# Patient Record
Sex: MALE | Race: WHITE | NOT HISPANIC OR LATINO | Employment: FULL TIME | URBAN - METROPOLITAN AREA
[De-identification: names, ages, dates, MRNs, and addresses within clinical notes are randomized per-mention and may not be internally consistent; named-entity substitution may affect disease eponyms.]

---

## 2018-09-07 ENCOUNTER — OFFICE VISIT CONVERTED (OUTPATIENT)
Dept: FAMILY MEDICINE CLINIC | Facility: CLINIC | Age: 48
End: 2018-09-07
Attending: NURSE PRACTITIONER

## 2020-10-05 ENCOUNTER — HOSPITAL ENCOUNTER (OUTPATIENT)
Dept: URGENT CARE | Facility: CLINIC | Age: 50
Discharge: HOME | End: 2020-10-05
Attending: FAMILY MEDICINE

## 2020-10-08 LAB — SARS-COV-2 RNA SPEC QL NAA+PROBE: NOT DETECTED

## 2021-05-16 VITALS
SYSTOLIC BLOOD PRESSURE: 117 MMHG | RESPIRATION RATE: 16 BRPM | TEMPERATURE: 97.6 F | BODY MASS INDEX: 20.02 KG/M2 | OXYGEN SATURATION: 99 % | WEIGHT: 143 LBS | DIASTOLIC BLOOD PRESSURE: 72 MMHG | HEART RATE: 48 BPM | HEIGHT: 71 IN

## 2021-09-10 PROCEDURE — U0004 COV-19 TEST NON-CDC HGH THRU: HCPCS | Performed by: NURSE PRACTITIONER

## 2021-09-12 ENCOUNTER — TELEPHONE (OUTPATIENT)
Dept: URGENT CARE | Facility: CLINIC | Age: 51
End: 2021-09-12

## 2021-09-14 ENCOUNTER — TELEPHONE (OUTPATIENT)
Dept: URGENT CARE | Facility: CLINIC | Age: 51
End: 2021-09-14

## 2022-09-16 ENCOUNTER — HOSPITAL ENCOUNTER (EMERGENCY)
Facility: HOSPITAL | Age: 52
Discharge: HOME | End: 2022-09-16
Attending: EMERGENCY MEDICINE | Admitting: EMERGENCY MEDICINE

## 2022-09-16 ENCOUNTER — APPOINTMENT (OUTPATIENT)
Dept: GENERAL RADIOLOGY | Facility: HOSPITAL | Age: 52
End: 2022-09-16

## 2022-09-16 VITALS
TEMPERATURE: 98.1 F | HEART RATE: 47 BPM | BODY MASS INDEX: 21.74 KG/M2 | SYSTOLIC BLOOD PRESSURE: 133 MMHG | WEIGHT: 160.5 LBS | HEIGHT: 72 IN | RESPIRATION RATE: 16 BRPM | OXYGEN SATURATION: 99 % | DIASTOLIC BLOOD PRESSURE: 89 MMHG

## 2022-09-16 DIAGNOSIS — S61.511A LACERATION OF RIGHT WRIST, INITIAL ENCOUNTER: Primary | ICD-10-CM

## 2022-09-16 DIAGNOSIS — S67.31XA CRUSHING INJURY OF RIGHT WRIST, INITIAL ENCOUNTER: ICD-10-CM

## 2022-09-16 PROCEDURE — 73130 X-RAY EXAM OF HAND: CPT

## 2022-09-16 PROCEDURE — 99283 EMERGENCY DEPT VISIT LOW MDM: CPT

## 2022-09-16 PROCEDURE — 73090 X-RAY EXAM OF FOREARM: CPT

## 2022-09-16 RX ORDER — HYDROCODONE BITARTRATE AND ACETAMINOPHEN 10; 325 MG/1; MG/1
1 TABLET ORAL EVERY 6 HOURS PRN
Status: DISCONTINUED | OUTPATIENT
Start: 2022-09-16 | End: 2022-09-16 | Stop reason: HOSPADM

## 2022-09-16 RX ORDER — HYDROCODONE BITARTRATE AND ACETAMINOPHEN 5; 325 MG/1; MG/1
1 TABLET ORAL EVERY 6 HOURS PRN
Qty: 12 TABLET | Refills: 0 | Status: SHIPPED | OUTPATIENT
Start: 2022-09-16 | End: 2022-09-19

## 2022-09-16 RX ORDER — CEPHALEXIN 500 MG/1
500 CAPSULE ORAL 2 TIMES DAILY
Qty: 10 CAPSULE | Refills: 0 | Status: SHIPPED | OUTPATIENT
Start: 2022-09-16 | End: 2022-09-21

## 2022-09-16 RX ORDER — LIDOCAINE HYDROCHLORIDE AND EPINEPHRINE 10; 10 MG/ML; UG/ML
10 INJECTION, SOLUTION INFILTRATION; PERINEURAL ONCE
Status: COMPLETED | OUTPATIENT
Start: 2022-09-16 | End: 2022-09-16

## 2022-09-16 RX ORDER — GINSENG 100 MG
1 CAPSULE ORAL ONCE
Status: COMPLETED | OUTPATIENT
Start: 2022-09-16 | End: 2022-09-16

## 2022-09-16 RX ORDER — LIDOCAINE AND PRILOCAINE 25; 25 MG/G; MG/G
1 CREAM TOPICAL ONCE
Status: COMPLETED | OUTPATIENT
Start: 2022-09-16 | End: 2022-09-16

## 2022-09-16 RX ADMIN — LIDOCAINE HYDROCHLORIDE,EPINEPHRINE BITARTRATE 10 ML: 10; .01 INJECTION, SOLUTION INFILTRATION; PERINEURAL at 09:10

## 2022-09-16 RX ADMIN — LIDOCAINE AND PRILOCAINE 1 APPLICATION: 25; 25 CREAM TOPICAL at 09:10

## 2022-09-16 RX ADMIN — Medication 1 APPLICATION: at 10:07

## 2022-09-16 RX ADMIN — HYDROCODONE BITARTRATE AND ACETAMINOPHEN 1 TABLET: 10; 325 TABLET ORAL at 09:10

## 2022-09-16 NOTE — ED PROVIDER NOTES
"Patient is 52 y.o. year old male that presents to the ED for evaluation of injury of right hand and wrist.     Physical Exam    ED Course:    /89   Pulse (!) 47   Temp 98.1 °F (36.7 °C) (Oral)   Resp 16   Ht 182.9 cm (72\")   Wt 72.8 kg (160 lb 7.9 oz)   SpO2 99%   BMI 21.77 kg/m²   Results for orders placed or performed during the hospital encounter of 09/10/21   COVID-19 PCR, [facility], NP SWAB IN LEXAR VIRAL TRANSPORT MEDIA/ORAL SWISH 24-30 HR TAT - Swab, Nasopharynx    Specimen: Nasopharynx; Swab   Result Value Ref Range    SARS-CoV-2 RACHAEL Not Detected Not Detected   POCT SARS-CoV-2 Antigen BOAZ   ([facility])    Specimen: Swab   Result Value Ref Range    SARS Antigen Not Detected Not Detected    Internal Control Passed Passed    Lot Number [id number]     Expiration Date 22,123      Medications   HYDROcodone-acetaminophen (NORCO)  MG per tablet 1 tablet (1 tablet Oral Given 9/16/22 0910)   lidocaine-prilocaine (EMLA) 2.5-2.5 % cream 1 application (1 application Topical Given 9/16/22 0910)   lidocaine 1% - EPINEPHrine 1:215229 (XYLOCAINE W/EPI) 1 %-1:617440 injection 10 mL (10 mL Injection Given 9/16/22 0910)   bacitracin 500 UNIT/GM ointment 1 application (1 application Topical Given 9/16/22 1007)     XR Forearm 2 View Right    Result Date: 9/16/2022  Narrative: PROCEDURE: XR FOREARM 2 VW RIGHT  COMPARISON: None  INDICATIONS: LATERAL MID RIGHT FOREARM KNOT, FALL ON STAIRS MOVING FRIDGE  FINDINGS:  Laceration noted along the ulnar aspect of the wrist no radiopaque foreign body identified.  There is no acute osseous abnormality appreciated.  Soft tissue swelling noted along the ventral aspect of the distal forearm into the wrist.  Mild induration of the subcutaneous fat along the mid radial aspect of the forearm      Impression:   1. Soft tissue swelling and laceration at the wrist 2. Mild soft tissue swelling of the mid forearm 3. No underlying osseous abnormality identified      " GABBY HERNANDEZ MD       Electronically Signed and Approved By: GABBY HERNANDEZ MD on 9/16/2022 at 9:06             XR Hand 3+ View Right    Result Date: 9/16/2022  Narrative: PROCEDURE: XR HAND 3+ VW RIGHT  COMPARISON: None  INDICATIONS: RIGHT HAND PAIN/MEDIAL 5TH METACARPAL LACERATION, FALL BRINGING FRIDGE UP STAIRS  FINDINGS:  Mild soft tissue swelling and lucency at the base of the ulnar aspect of the hand compatible with given history of laceration.  No radiopaque foreign body or acute osseous abnormality noted.  Mild degenerative changes noted of the wrist greatest along the radial aspect of the intercarpal row      Impression:   1. Soft tissue swelling and laceration without radiopaque foreign body 2. No definite acute osseous abnormality      GABBY HERNANDEZ MD       Electronically Signed and Approved By: GABBY HERNANDEZ MD on 9/16/2022 at 8:34               MDM:    Procedures      The case was discussed between the ERNIE and myself. Patient  care including, but not limited to ordered imaging, medications, and lab results were reviewed. I then performed the substantive portion of the visit including all aspects of the medical decision making.        Justo Diaz DO  12:31 EDT  09/16/22       Justo Diaz DO  09/16/22 1231

## 2022-09-16 NOTE — ED PROVIDER NOTES
Subjective   History of Present Illness  The patient is a 52-year-old male with no prominent past medical history presents emergency department with chief complaint of right wrist injury and laceration.  The patient was using a logan to move a refrigerator out of his house.  He was going down a front porch steps when the logan tipped backwards.  The refrigerator and the logan landed on his right wrist and trapping it between the tile patio step and the logan.  Entrapped for matter of seconds.  Has a laceration to medial right wrist with swelling and pain.  Also notes some swelling to the middle forearm.  Denies any numbness.  Pain with movement.  Tetanus is up-to-date. Pain is moderate to severe and sharp.    History provided by:  Patient   used: No    Laceration  Location:  Shoulder/arm  Shoulder/arm laceration location:  R wrist  Length:  4 cm  Depth:  Through dermis  Quality: straight    Bleeding: controlled    Time since incident:  1 hour  Laceration mechanism:  Fall  Pain details:     Quality:  Sharp and aching  Foreign body present:  No foreign bodies  Relieved by:  None tried  Worsened by:  Nothing  Ineffective treatments:  None tried  Tetanus status:  Up to date  Associated symptoms: swelling    Associated symptoms: no fever and no numbness           Review of Systems   Constitutional: Negative for chills and fever.   Gastrointestinal: Negative for nausea and vomiting.   Musculoskeletal: Positive for arthralgias.   Skin: Positive for wound.   Neurological: Negative for weakness and numbness.   All other systems reviewed and are negative.      No past medical history on file.    Allergies   Allergen Reactions   • Codeine Hives and Nausea And Vomiting       Past Surgical History:   Procedure Laterality Date   • APPENDECTOMY         No family history on file.    Social History     Socioeconomic History   • Marital status:    Tobacco Use   • Smoking status: Current Every Day Smoker      Packs/day: 1.00     Types: Cigarettes   • Smokeless tobacco: Never Used   Vaping Use   • Vaping Use: Never used   Substance and Sexual Activity   • Alcohol use: Never   • Drug use: Defer   • Sexual activity: Defer           Objective   Physical Exam  Vitals and nursing note reviewed.   Constitutional:       Appearance: Normal appearance. He is not ill-appearing or toxic-appearing.   Cardiovascular:      Rate and Rhythm: Normal rate.      Pulses: Normal pulses.   Pulmonary:      Effort: Pulmonary effort is normal.   Musculoskeletal:         General: Swelling and tenderness present.   Skin:     Capillary Refill: Capillary refill takes less than 2 seconds.      Comments: laceration   Neurological:      Mental Status: He is alert.      Sensory: No sensory deficit.         Laceration Repair    Date/Time: 9/16/2022 10:43 AM  Performed by: Olamide Ness PA-C  Authorized by: Justo Diaz DO     Consent:     Consent obtained:  Verbal    Consent given by:  Patient    Risks, benefits, and alternatives were discussed: yes      Risks discussed:  Infection, pain, nerve damage and need for additional repair    Alternatives discussed:  No treatment  Universal protocol:     Patient identity confirmed:  Verbally with patient  Anesthesia:     Anesthesia method:  Topical application and local infiltration    Topical anesthetic:  EMLA cream    Local anesthetic:  Lidocaine 1% w/o epi  Laceration details:     Location:  Shoulder/arm    Shoulder/arm location:  R lower arm    Length (cm):  4  Exploration:     Imaging obtained: x-ray      Wound exploration: wound explored through full range of motion      Contaminated: no    Treatment:     Area cleansed with:  Chlorhexidine  Skin repair:     Repair method:  Sutures    Suture size:  4-0    Wound skin closure material used: ethilon.    Suture technique:  Simple interrupted    Number of sutures:  11  Approximation:     Approximation:  Close  Repair type:     Repair type:   Simple  Post-procedure details:     Dressing:  Antibiotic ointment and splint for protection    Procedure completion:  Tolerated well, no immediate complications               ED Course                                           MDM     The patient presented with a laceration in need of repair. See laceration repair note for details. The wound was irrigated with copious normal saline irrigation. 11 sutures were used to approximate the wound edges. Tetanus was not given. The patient tolerated the procedure well. Acute bleeding has ceased and the wound was approximated in the emergency department. I have reviewed the imaging, no fracture noted by radiology however small step-off questionable for hairline fracture. Discussed with Dr. Diaz supervising physician, in agreement with splint placement and orthopedic follow-up. He is neurovascularly intact. Patient was counseled to keep the wound clean, dry, and out of the sun. Patient was counseled to change dressings daily. Patient was advised to return to the ED for worsening erythema, pain, swelling, fever, excessive drainage or signs of infection. They were counseled to follow up for suture removal as described in the discharge instructions. Patient verbalizes understanding and agrees to follow up as instructed.          Final diagnoses:   Laceration of right wrist, initial encounter   Crushing injury of right wrist, initial encounter       ED Disposition  ED Disposition     ED Disposition   Discharge    Condition   Stable    Comment   --             Chiki Souza MD  [address] [id number] [phone]    Call   and schedule a follow-up appointment    Cayden Kunz MD  [address] [facility] [address] [phone]          [facility] EMERGENCY ROOM  [address] [facility] [address] [phone]    If symptoms worsen         Medication List      New Prescriptions    cephalexin 500 MG capsule  Commonly known as:  KEFLEX  Take 1 capsule by mouth 2 (Two) Times a Day for 5 days.     HYDROcodone-acetaminophen 5-325 MG per tablet  Commonly known as: NORCO  Take 1 tablet by mouth Every 6 (Six) Hours As Needed for Moderate Pain or Severe Pain for up to 3 days.           Where to Get Your Medications      These medications were sent to [facility]/pharmacy #[id number] - [place], [place] - [address] - [phone] [facility] [phone]   [address] [place] [address]    Hours: 24-hours Phone: [phone]   · cephalexin 500 MG capsule  · HYDROcodone-acetaminophen 5-325 MG per tablet          Olamide Ness PA-C  09/16/22 7720

## 2022-09-16 NOTE — DISCHARGE INSTRUCTIONS
On your X-ray there is a small step-off that could be a questionable hairline fracture. We have repaired your laceration and placed a splint. I have referred you to orhthopedics. Call their office today or first thing on Monday to schedule a follow-up appointment. Return to the ED with any signs of infection or a cold/pale/numb extremity.

## 2022-09-20 ENCOUNTER — OFFICE VISIT (OUTPATIENT)
Dept: ORTHOPEDIC SURGERY | Facility: CLINIC | Age: 52
End: 2022-09-20

## 2022-09-20 VITALS — WEIGHT: 160 LBS | BODY MASS INDEX: 21.67 KG/M2 | HEART RATE: 69 BPM | HEIGHT: 72 IN | OXYGEN SATURATION: 99 %

## 2022-09-20 DIAGNOSIS — S69.91XA INJURY OF RIGHT WRIST, INITIAL ENCOUNTER: ICD-10-CM

## 2022-09-20 DIAGNOSIS — S61.511A LACERATION OF SKIN OF RIGHT WRIST, INITIAL ENCOUNTER: Primary | ICD-10-CM

## 2022-09-20 PROCEDURE — 99203 OFFICE O/P NEW LOW 30 MIN: CPT | Performed by: ORTHOPAEDIC SURGERY

## 2022-09-20 NOTE — PROGRESS NOTES
"Chief Complaint  Initial Evaluation of the Right Forearm and Initial Evaluation of the Right Hand     Subjective      Carlos Alberto Juan presents to [facility] for an evaluation of right forearm and right hand. Patient was using a logan to move a fridge out his home. When going down the porch steps, the logan tipped backwards and the fridge landed on his right arm. This trapped it between the patio tile step and logan. Patient had immediate pain when this occurred. Injury sustained on 9/16/22. Most of his pain is about the dorsum aspect of the hand and wrist.     Allergies   Allergen Reactions   • Codeine Hives and Nausea And Vomiting        Social History     Socioeconomic History   • Marital status:    Tobacco Use   • Smoking status: Current Every Day Smoker     Packs/day: 1.00     Types: Cigarettes   • Smokeless tobacco: Never Used   Vaping Use   • Vaping Use: Never used   Substance and Sexual Activity   • Alcohol use: Never   • Drug use: Defer   • Sexual activity: Defer        Review of Systems     Objective   Vital Signs:   Pulse 69   Ht 182.9 cm (72\")   Wt 72.6 kg (160 lb)   SpO2 99%   BMI 21.70 kg/m²       Physical Exam  Constitutional:       Appearance: Normal appearance. Patient is well-developed and normal weight.   HENT:      Head: Normocephalic.      Right Ear: Hearing and external ear normal.      Left Ear: Hearing and external ear normal.      Nose: Nose normal.   Eyes:      Conjunctiva/sclera: Conjunctivae normal.   Cardiovascular:      Rate and Rhythm: Normal rate.   Pulmonary:      Effort: Pulmonary effort is normal.      Breath sounds: No wheezing or rales.   Abdominal:      Palpations: Abdomen is soft.      Tenderness: There is no abdominal tenderness.   Musculoskeletal:      Cervical back: Normal range of motion.   Skin:     Findings: No rash.   Neurological:      Mental Status: Patient is alert and oriented to person, place, and time.   Psychiatric:    "      Mood and Affect: Mood and affect normal.         Judgment: Judgment normal.       Ortho Exam      RIGHT ARM: Laceration noted on the ulnar aspect of the wrist, stitches noted. Sensation grossly intact. Neurovascular intact.  Radial pulse 2+, ulnar pulse 2+. Moderate swelling. Good supination and pronation of the wrist. Patient able to form a fist.      Procedures      Imaging Results (Most Recent)     None           Result Review :         XR Forearm 2 View Right    Result Date: 9/16/2022  Narrative: PROCEDURE: XR FOREARM 2 VW RIGHT  COMPARISON: None  INDICATIONS: LATERAL MID RIGHT FOREARM KNOT, FALL ON STAIRS MOVING FRIDGE  FINDINGS:  Laceration noted along the ulnar aspect of the wrist no radiopaque foreign body identified.  There is no acute osseous abnormality appreciated.  Soft tissue swelling noted along the ventral aspect of the distal forearm into the wrist.  Mild induration of the subcutaneous fat along the mid radial aspect of the forearm      Impression:   1. Soft tissue swelling and laceration at the wrist 2. Mild soft tissue swelling of the mid forearm 3. No underlying osseous abnormality identified      GABBY HERNANDEZ MD       Electronically Signed and Approved By: GABBY HERNANDEZ MD on 9/16/2022 at 9:06             XR Hand 3+ View Right    Result Date: 9/16/2022  Narrative: PROCEDURE: XR HAND 3+ VW RIGHT  COMPARISON: None  INDICATIONS: RIGHT HAND PAIN/MEDIAL 5TH METACARPAL LACERATION, FALL BRINGING FRIDGE UP STAIRS  FINDINGS:  Mild soft tissue swelling and lucency at the base of the ulnar aspect of the hand compatible with given history of laceration.  No radiopaque foreign body or acute osseous abnormality noted.  Mild degenerative changes noted of the wrist greatest along the radial aspect of the intercarpal row      Impression:   1. Soft tissue swelling and laceration without radiopaque foreign body 2. No definite acute osseous abnormality      GABBY HERNANDEZ MD       Electronically  Signed and Approved By: GABBY HERNANDEZ MD on 9/16/2022 at 8:34                      Assessment and Plan     Diagnoses and all orders for this visit:    1. Laceration of skin of right wrist, initial encounter (Primary)    2. Injury of right wrist, initial encounter        Continue antibiotics. Ice and elevate the right wrist to help reduce swelling.   Anti-inflammatory medication prescribed.   Work on finger range of motion to avoid stiffness.   Work note provided for the patient.     Call or return if worsening symptoms.    Follow Up     9/29/22 for wound check and stitch removal.       Patient was given instructions and counseling regarding his condition or for health maintenance advice. Please see specific information pulled into the AVS if appropriate.     Scribed for Chiki Souza MD by Sary Gao.  09/20/22   08:58 EDT    I have personally performed the services described in this document as scribed by the above individual and it is both accurate and complete. Chiki Souza MD 09/20/22

## 2022-09-22 ENCOUNTER — PATIENT ROUNDING (BHMG ONLY) (OUTPATIENT)
Dept: ORTHOPEDIC SURGERY | Facility: CLINIC | Age: 52
End: 2022-09-22

## 2022-09-22 NOTE — PROGRESS NOTES
A [employer] message has been sent to the patient for [place]IENT [place] [facility]

## 2022-09-29 ENCOUNTER — OFFICE VISIT (OUTPATIENT)
Dept: ORTHOPEDIC SURGERY | Facility: CLINIC | Age: 52
End: 2022-09-29

## 2022-09-29 VITALS — HEIGHT: 72 IN | WEIGHT: 160 LBS | BODY MASS INDEX: 21.67 KG/M2

## 2022-09-29 DIAGNOSIS — S69.91XA INJURY OF RIGHT WRIST, INITIAL ENCOUNTER: ICD-10-CM

## 2022-09-29 DIAGNOSIS — S61.511A LACERATION OF SKIN OF RIGHT WRIST, INITIAL ENCOUNTER: Primary | ICD-10-CM

## 2022-09-29 PROCEDURE — 99213 OFFICE O/P EST LOW 20 MIN: CPT | Performed by: PHYSICIAN ASSISTANT

## 2022-09-29 NOTE — PROGRESS NOTES
"Chief Complaint  Follow-up of the Right Wrist    Subjective      Carlos Alberto Juan presents to [facility] for follow-up of right wrist injury sustained on 9/16/2022.  The patient was using a logan to move a refrigerator.  The logan tipped backwards while he was taking this down the stairs and the refrigerator fell, landing on his arm and pinning it in between the refrigerator and the stair.  He sustained a laceration, which was repaired with sutures.  He presents today for suture removal.  X-rays showed no acute osseous injury.  Does report some continued pain in his right wrist, although is able to perform full ROM.    Objective   Allergies   Allergen Reactions   • Codeine Hives and Nausea And Vomiting       Vital Signs:   Ht 182.9 cm (72\")   Wt 72.6 kg (160 lb)   BMI 21.70 kg/m²       Physical Exam    Constitutional: Awake, alert. Well nourished appearance.    Integumentary: Warm, dry, intact. No obvious rashes.    HENT: Atraumatic, normocephalic.   Respiratory: Non labored respirations .   Cardiovascular: Intact peripheral pulses.    Psychiatric: Normal mood and affect. A&O X3    Ortho Exam  Right wrist: Sutures removed.  Laceration appears well-healing -clean, dry, and intact without any surrounding erythema, warmth, or drainage.  Full wrist flexion, extension, supination, pronation.  Patient is able to make a full fist.  Good thumb opposition.  Full abduction and adduction of the fingers.    Imaging Results (Most Recent)     None            Assessment and Plan   Problem List Items Addressed This Visit    None     Visit Diagnoses     Laceration of skin of right wrist, subsequent encounter    -  Primary    Injury of right wrist, subsequent encounter            Follow Up   Return if symptoms worsen or fail to improve.    Patient Instructions   Sutures removed in office and Steri-Strips applied.  Patient educated on incision care.  Please keep incision clean and dry.  Do not soak " or submerge in water until incision is fully healed.  Do not apply creams or lotions over the incision.  Please allow Steri-Strips to fall off on their own within 7 to 10 days.    Continue icing as needed to help with pain and swelling.  Ice up to 3 or 4 times daily for no longer than 15 to 20 minutes at a time. Advised on gentle hand and wrist ROM. Gradually return to normal activity as tolerated.     Work note provided.     Follow-up as needed. Please call with questions or concerns.      Patient was given instructions and counseling regarding his condition or for health maintenance advice. Please see specific information pulled into the AVS if appropriate.

## 2022-09-29 NOTE — PATIENT INSTRUCTIONS
Sutures removed in office and Steri-Strips applied.  Patient educated on incision care.  Please keep incision clean and dry.  Do not soak or submerge in water until incision is fully healed.  Do not apply creams or lotions over the incision.  Please allow Steri-Strips to fall off on their own within 7 to 10 days.    Continue icing as needed to help with pain and swelling.  Ice up to 3 or 4 times daily for no longer than 15 to 20 minutes at a time. Advised on gentle hand and wrist ROM. Gradually return to normal activity as tolerated.     Work note provided.     Follow-up as needed. Please call with questions or concerns.

## 2022-10-10 ENCOUNTER — TELEPHONE (OUTPATIENT)
Dept: ORTHOPEDIC SURGERY | Facility: CLINIC | Age: 52
End: 2022-10-10

## 2022-10-10 NOTE — TELEPHONE ENCOUNTER
Caller: EVERARDO     Relationship to patient: SELF     Best call back number: [phone]     Patient is needing: PT RETURNED TO WORK AFTER WRIST INJURY 10.8.22 , HE STARTED HAVING A LOT OF PAIN IN THAT WRIST DURING WORK AND IS CALLING IN TO SEE WHAT HE COULD DO NOW. HE IS SCHEDULED TO DO BACK INTO WORK TONIGHT 10.10.22. PLEASE ADVISE

## 2022-10-10 NOTE — TELEPHONE ENCOUNTER
PATIENT CALLED BACK- STATES HE SLATED TO RETURN TO WORK THIS EVENING- NEEDS TO KNOW WHAT HE SHOULD DO OR NOT DO.  DOES HE NEED A NOTE FOR WORK REGARDING THE INCREASE IN PAIN IN HIS HAND.  PATIENT STATES HE CAN  A NOTE IF A NOTE IS GIVEN FOR HIM TO TAKE TO WORK.  PLEASE CONTACT PATIENT- HUB ATTEMPTED W/T

## 2022-10-11 NOTE — TELEPHONE ENCOUNTER
SPOKE WITH PATIENT, LET HIM KNOW OF CARO'S BELOW MESSAGE. PATIENT IS HAVING MULTIPLE ISSUES WITH PAIN IN THE WRIST AND INABILITY TO PREFORM HIS WORK DUTIES. HE DOES NOT FEEL LIKE HE IS ABLE TO RETURN TO HIS NORMAL WORK DUTIES AT THIS TIME AND IS CONCERNED THAT THE WRIST IS NOT HEALING CORRECTLY. PATIENT WAS TO FOLLOW UP AS NEEDED AFTER LAST APPT SO HE HAS NO FOLLOW UP AT THIS TIME. APPOINTMENT SCHEDULED FOR PATIENT TO FOLLOW UP ON 10/13/22 AT 2:00PM.

## 2022-10-13 ENCOUNTER — OFFICE VISIT (OUTPATIENT)
Dept: ORTHOPEDIC SURGERY | Facility: CLINIC | Age: 52
End: 2022-10-13

## 2022-10-13 VITALS — WEIGHT: 160 LBS | BODY MASS INDEX: 21.67 KG/M2 | HEIGHT: 72 IN

## 2022-10-13 DIAGNOSIS — S69.91XA INJURY OF RIGHT WRIST, INITIAL ENCOUNTER: ICD-10-CM

## 2022-10-13 DIAGNOSIS — S69.91XA INJURY OF RIGHT WRIST, INITIAL ENCOUNTER: Primary | ICD-10-CM

## 2022-10-13 PROCEDURE — 99213 OFFICE O/P EST LOW 20 MIN: CPT | Performed by: PHYSICIAN ASSISTANT

## 2022-10-13 NOTE — PATIENT INSTRUCTIONS
X-rays taken and reviewed. Discussed with Dr. Souza. Patient advised to return to wrist brace. No lifting, pushing, or pulling. Referral to PT sent today. Continue home exercises.     Work note provided for patient to remain off.     Follow up in 4 weeks. No imaging. Call with changes or concerns.

## 2022-10-13 NOTE — PROGRESS NOTES
"Chief Complaint  Follow-up of the Right Wrist    Subjective      Carlos Alberto Juan presents to [facility] for follow-up of right wrist injury sustained on 9/16/2022.  Patient was attempting to move a refrigerator using a logan when the logan tipped backwards and the refrigerator fell, landing on his arm.  He sustained a laceration, treated with suture repair, which were subsequently removed.  X-rays of the hand and forearm were negative for fracture.  He was last seen in office on 9/29/2022 with recommendations to work on gentle range of motion and gradually return to activity as tolerated.  Patient reports he was on vacation and did minimal activity, tolerated well.  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  Reports his wrist was swollen.  He does not feel as if he is able to perform his work duties at this time.    Objective   Allergies   Allergen Reactions   • Codeine Hives and Nausea And Vomiting       Vital Signs:   Ht 182.9 cm (72\")   Wt 72.6 kg (160 lb)   BMI 21.70 kg/m²       Physical Exam    Constitutional: Awake, alert. Well nourished appearance.    Integumentary: Warm, dry, intact. No obvious rashes.    HENT: Atraumatic, normocephalic.   Respiratory: Non labored respirations .   Cardiovascular: Intact peripheral pulses.    Psychiatric: Normal mood and affect. A&O X3    Ortho Exam  Right wrist: Mild tenderness to palpation throughout the entire right wrist.  Pain limited flexion and extension of the right wrist.  Limited supination pronation.  Patient is able to form a full fist.  Sensation intact to light touch.  Distal neurovascular intact with brisk capillary refill and 2+ radial and ulnar pulses.  Good thumb opposition.    Imaging Results (Most Recent)     Procedure Component Value Units Date/Time    XR Wrist 2 View Right [[id number]] Resulted: 10/13/22 1536     Updated: " 10/13/22 1536    Narrative:      X-Ray Report:  Study: X-rays ordered, taken in the office, and reviewed today.   Site: right wrist Xray  Indication: Pain  View: AP and Lateral view(s)  Findings: No acute osseous abnormalities identified.  Prior studies available for comparison: no                       Assessment and Plan   Problem List Items Addressed This Visit    None  Visit Diagnoses     Injury of right wrist, subsequent encounter    -  Primary    Relevant Orders    XR Wrist 2 View Right (Completed)    Ambulatory Referral to Physical Therapy Evaluate and treat; Stretching, ROM, Strengthening (Completed)    Injury of right wrist, initial encounter        Relevant Orders    XR Wrist 2 View Right (Completed)    Ambulatory Referral to Physical Therapy Evaluate and treat; Stretching, ROM, Strengthening (Completed)          Follow Up   Return in about 4 weeks (around 11/10/2022).    Patient Instructions   X-rays taken and reviewed. Discussed with Dr. Souza. Patient advised to return to wrist brace. No lifting, pushing, or pulling. Referral to PT sent today. Continue home exercises.     Work note provided for patient to remain off.     Follow up in 4 weeks. No imaging. Call with changes or concerns.     Patient was given instructions and counseling regarding his condition or for health maintenance advice. Please see specific information pulled into the AVS if appropriate.

## 2022-11-10 ENCOUNTER — OFFICE VISIT (OUTPATIENT)
Dept: ORTHOPEDIC SURGERY | Facility: CLINIC | Age: 52
End: 2022-11-10

## 2022-11-10 VITALS — WEIGHT: 160 LBS | BODY MASS INDEX: 21.67 KG/M2 | HEIGHT: 72 IN

## 2022-11-10 DIAGNOSIS — S69.91XA INJURY OF RIGHT WRIST, INITIAL ENCOUNTER: Primary | ICD-10-CM

## 2022-11-10 PROCEDURE — 99213 OFFICE O/P EST LOW 20 MIN: CPT | Performed by: PHYSICIAN ASSISTANT

## 2022-11-10 NOTE — PATIENT INSTRUCTIONS
Patient advised to continue PT to completion. Continue home exercises upon discharge. Gradually return to activity as tolerated.     Follow up in 2 weeks. Evaluate improvement with PT and readiness to return to work. No imaging. Call with changes or concerns.

## 2022-11-10 NOTE — PROGRESS NOTES
"Chief Complaint  Follow-up of the Right Wrist    Subjective      Carlos Alberto Juan presents to [facility] for follow-up of right wrist injury sustained on 9/16/2022.  X-rays of the hand and forearm were negative for fractures.  He was last seen in office on 10/13/2022 and received referral to physical therapy.  He presents today for follow-up reporting that he is \"definitely getting better\".  Does report that he has had increased pain since PT session on Monday.  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    Objective   Allergies   Allergen Reactions   • Codeine Hives and Nausea And Vomiting       Vital Signs:   Ht 182.9 cm (72\")   Wt 72.6 kg (160 lb)   BMI 21.70 kg/m²       Physical Exam    Constitutional: Awake, alert. Well nourished appearance.    Integumentary: Warm, dry, intact. No obvious rashes.    HENT: Atraumatic, normocephalic.   Respiratory: Non labored respirations .   Cardiovascular: Intact peripheral pulses.    Psychiatric: Normal mood and affect. A&O X3    Ortho Exam  Right wrist: Mild edema.  Limited wrist flexion and extension.  Full supination and pronation.  Patient is able to form a full fist.  Sensation intact to light touch.  Distal neurovascular intact.    Imaging Results (Most Recent)     None           Assessment and Plan   Problem List Items Addressed This Visit    None  Visit Diagnoses     Injury of right wrist, subsequent encounter    -  Primary        Follow Up   Return in about 2 weeks (around 11/24/2022).  Educated on risk of smoking. Discussed options for smoking cessation.    Patient Instructions   Patient advised to continue PT to completion. Continue home exercises upon discharge. Gradually return to activity as tolerated.     Follow up in 2 weeks. Evaluate improvement with PT and readiness to return to work. No imaging. Call with changes or " concerns.     Patient was given instructions and counseling regarding his condition or for health maintenance advice. Please see specific information pulled into the AVS if appropriate.

## 2022-12-02 ENCOUNTER — OFFICE VISIT (OUTPATIENT)
Dept: ORTHOPEDIC SURGERY | Facility: CLINIC | Age: 52
End: 2022-12-02

## 2022-12-02 VITALS — HEIGHT: 72 IN | BODY MASS INDEX: 21.67 KG/M2 | WEIGHT: 160 LBS

## 2022-12-02 DIAGNOSIS — S69.91XA INJURY OF RIGHT WRIST, INITIAL ENCOUNTER: Primary | ICD-10-CM

## 2022-12-02 PROCEDURE — 99212 OFFICE O/P EST SF 10 MIN: CPT | Performed by: PHYSICIAN ASSISTANT

## 2022-12-02 NOTE — PROGRESS NOTES
"Chief Complaint  Follow-up of the Right Wrist    Subjective      Carlos Alberto Juan presents to [facility] for follow-up of right wrist injury sustained on 9/16/2022.  X-rays of the hand and forearm were negative for fractures.  He was last seen in office on 10/13/2022 and received referral to physical therapy.  He presents today for follow-up reporting that he is \"definitely getting better\".  Does report that he has had increased pain since PT session on Monday.  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 He needs to return to work and will be written a note for release.     Objective   Allergies   Allergen Reactions   • Codeine Hives and Nausea And Vomiting       Vital Signs:   Ht 182.9 cm (72\")   Wt 72.6 kg (160 lb)   BMI 21.70 kg/m²       Physical Exam    Constitutional: Awake, alert. Well nourished appearance.    Integumentary: Warm, dry, intact. No obvious rashes.    HENT: Atraumatic, normocephalic.   Respiratory: Non labored respirations .   Cardiovascular: Intact peripheral pulses.    Psychiatric: Normal mood and affect. A&O X3    Ortho Exam    Right wrist: Mild edema.  Limited wrist flexion and extension.  Full supination and pronation.  Patient is able to form a full fist.  Sensation intact to light touch.  Distal neurovascular intact. Radial pulse 2+. Ulnar pulse 2+.  No swelling. No skin discoloration or muscle atrophy. Full , thumb opposition, MCP flexors, DIP flexors and PIP flexors.     Imaging Results (Most Recent)     None                    Assessment and Plan   Problem List Items Addressed This Visit    None  Visit Diagnoses     Injury of right wrist, subsequent encounter    -  Primary          Follow Up     Educated on risk of smoking. Discussed options for smoking cessation. Call back with any changes or concerns.     Discussed the treatment plan with the patient. Discussed activity " modification to prevent further injury and decrease pain. He was written a note to return to work on 12/5/22.     Scribed for CINTIA Matos by Rupali Sánchez MA/Tye. 12/2/2022  10:21 EST      Patient was given instructions and counseling regarding his condition or for health maintenance advice. Please see specific information pulled into the AVS if appropriate.

## 2023-08-12 ENCOUNTER — TELEPHONE (OUTPATIENT)
Dept: URGENT CARE | Facility: CLINIC | Age: 53
End: 2023-08-12

## 2023-08-12 PROCEDURE — 87081 CULTURE SCREEN ONLY: CPT | Performed by: STUDENT IN AN ORGANIZED HEALTH CARE EDUCATION/TRAINING PROGRAM

## 2023-08-12 NOTE — TELEPHONE ENCOUNTER
Called, patient answered, he confirmed his date of birth, relayed results from chest x-ray, no signs of pneumonia, no pleural effusion, old granulomatous disease noted. Patient reports no further questions at this time, stated he may call back if he has any, patient expresses understanding and agreement.     -John Cooksey, PA-C

## 2023-08-14 ENCOUNTER — TELEPHONE (OUTPATIENT)
Dept: URGENT CARE | Facility: CLINIC | Age: 53
End: 2023-08-14

## 2023-08-14 NOTE — TELEPHONE ENCOUNTER
----- Message from MARY Wei sent at 8/14/2023  9:23 AM EDT -----  Please notify patient of negative beta strep test result.

## 2023-08-15 ENCOUNTER — TELEPHONE (OUTPATIENT)
Dept: URGENT CARE | Facility: CLINIC | Age: 53
End: 2023-08-15

## 2023-08-16 ENCOUNTER — TELEPHONE (OUTPATIENT)
Dept: URGENT CARE | Facility: CLINIC | Age: 53
End: 2023-08-16

## 2024-01-05 ENCOUNTER — OFFICE VISIT (OUTPATIENT)
Dept: INTERNAL MEDICINE | Facility: CLINIC | Age: 54
End: 2024-01-05
Payer: COMMERCIAL

## 2024-01-05 VITALS
BODY MASS INDEX: 20.64 KG/M2 | HEIGHT: 72 IN | HEART RATE: 52 BPM | TEMPERATURE: 98.4 F | RESPIRATION RATE: 18 BRPM | OXYGEN SATURATION: 98 % | DIASTOLIC BLOOD PRESSURE: 86 MMHG | SYSTOLIC BLOOD PRESSURE: 130 MMHG | WEIGHT: 152.4 LBS

## 2024-01-05 DIAGNOSIS — Z76.89 ENCOUNTER TO ESTABLISH CARE: Primary | ICD-10-CM

## 2024-01-05 DIAGNOSIS — G62.9 NEUROPATHY: ICD-10-CM

## 2024-01-05 DIAGNOSIS — R03.0 ELEVATED BLOOD PRESSURE READING: ICD-10-CM

## 2024-01-05 DIAGNOSIS — G89.29 CHRONIC LOW BACK PAIN, UNSPECIFIED BACK PAIN LATERALITY, UNSPECIFIED WHETHER SCIATICA PRESENT: ICD-10-CM

## 2024-01-05 DIAGNOSIS — Z12.11 ENCOUNTER FOR SCREENING FOR MALIGNANT NEOPLASM OF COLON: ICD-10-CM

## 2024-01-05 DIAGNOSIS — Z13.220 SCREENING FOR LIPID DISORDERS: ICD-10-CM

## 2024-01-05 DIAGNOSIS — Z87.891 PERSONAL HISTORY OF TOBACCO USE: ICD-10-CM

## 2024-01-05 DIAGNOSIS — M54.50 CHRONIC LOW BACK PAIN, UNSPECIFIED BACK PAIN LATERALITY, UNSPECIFIED WHETHER SCIATICA PRESENT: ICD-10-CM

## 2024-01-05 DIAGNOSIS — Z01.89 ROUTINE LAB DRAW: ICD-10-CM

## 2024-01-05 DIAGNOSIS — H69.93 DYSFUNCTION OF BOTH EUSTACHIAN TUBES: ICD-10-CM

## 2024-01-05 DIAGNOSIS — Z12.5 SCREENING FOR PROSTATE CANCER: ICD-10-CM

## 2024-01-05 NOTE — ASSESSMENT & PLAN NOTE
Chronic lower back pain-patient states he was jumping on a trampoline with his son about 8/9 years ago and he slipped a disc and had significant pain. He did end up seeing neurosurgery, Dr. Preston.  They did not recommend surgery at that time but said he might need it eventually. He states his symptoms improved significantly and he had not had any issues. Unfortunately, over the last several months he has had some a burning pain and numbness in his right leg. He states it is better if he is moving around. Patient states he did aggravate his back a few weeks ago bending over.  Seems to be improving some.  Denies any loss of bowel or bladder function.  Will get a new MRI of his lumbar spine.  Discussed patient he may have to go back to see Dr. Preston.

## 2024-01-05 NOTE — ASSESSMENT & PLAN NOTE
Pt admits to bilat ear fullness and decreased hearing at times.  He does have fluid present bilaterally.  Recommended he add in daily antihistamine and flonase

## 2024-01-05 NOTE — ASSESSMENT & PLAN NOTE
Patient reports his blood pressure typically runs around 120/80  Recommend he continue to monitor it periodically.

## 2024-01-05 NOTE — PROGRESS NOTES
"Chief Complaint  Establish Care (/) and Leg Problem (Right leg burning upper extremity- X3 months)    History of Present Illness  SUBJECTIVE  Carlos Alberto Juan presents to [facility] to establish care.      Tobacco use-smokes 1 PPD for 30 + years, he is trying to quit  Alcohol use-denies use.  Colon cancer screening-needs  Psa-needs  Flu vaccine-declines  Covid-19 vaccine-declines.    Patient denies any chest pain, shortness of breath, palpitations, nausea, vomiting, diarrhea, change in bowel or bladder habits.        History reviewed. No pertinent past medical history.   History reviewed. No pertinent family history.   Past Surgical History:   Procedure Laterality Date    APPENDECTOMY        No current outpatient medications on file.    OBJECTIVE  Vital Signs:   /86 (BP Location: Left arm, Patient Position: Sitting, Cuff Size: Adult)   Pulse 52   Temp 98.4 °F (36.9 °C) (Temporal)   Resp 18   Ht 182.9 cm (72.01\")   Wt 69.1 kg (152 lb 6.4 oz)   SpO2 98%   BMI 20.66 kg/m²    Estimated body mass index is 20.66 kg/m² as calculated from the following:    Height as of this encounter: 182.9 cm (72.01\").    Weight as of this encounter: 69.1 kg (152 lb 6.4 oz).     Wt Readings from Last 3 Encounters:   01/05/24 69.1 kg (152 lb 6.4 oz)   08/12/23 67.9 kg (149 lb 9.6 oz)   06/20/23 72 kg (158 lb 11.2 oz)     BP Readings from Last 3 Encounters:   01/05/24 130/86   08/12/23 137/83   06/20/23 133/74       Physical Exam  Vitals and nursing note reviewed.   Constitutional:       Appearance: Normal appearance.   HENT:      Head: Normocephalic.      Right Ear: A middle ear effusion is present.      Left Ear: A middle ear effusion is present.   Eyes:      Extraocular Movements: Extraocular movements intact.      Conjunctiva/sclera: Conjunctivae normal.   Cardiovascular:      Rate and Rhythm: Normal rate and regular rhythm.      Heart sounds: Normal heart sounds. No murmur " heard.  Pulmonary:      Effort: Pulmonary effort is normal.      Breath sounds: Normal breath sounds. No wheezing or rales.   Abdominal:      General: Bowel sounds are normal.      Palpations: Abdomen is soft.      Tenderness: There is no abdominal tenderness. There is no guarding.   Musculoskeletal:         General: No swelling. Normal range of motion.      Cervical back: Normal range of motion and neck supple.      Right lower leg: No edema.      Left lower leg: No edema.   Skin:     General: Skin is warm and dry.   Neurological:      General: No focal deficit present.      Mental Status: He is alert. Mental status is at baseline.   Psychiatric:         Mood and Affect: Mood normal.         Behavior: Behavior normal.         Thought Content: Thought content normal.         Judgment: Judgment normal.          Result Review        No Images in the past 120 days found..     The above data has been reviewed by MARY Rosas 01/05/2024 11:00 EST.          Patient Care Team:  Ksenia Mazariegos APRN as PCP - General (Internal Medicine)    BMI is within normal parameters. No other follow-up for BMI required.       ASSESSMENT & PLAN    Diagnoses and all orders for this visit:    1. Encounter to establish care (Primary)    2. Dysfunction of both eustachian tubes  Assessment & Plan:  Pt admits to bilat ear fullness and decreased hearing at times.  He does have fluid present bilaterally.  Recommended he add in daily antihistamine and flonase      3. Chronic low back pain, unspecified back pain laterality, unspecified whether sciatica present  Assessment & Plan:  Chronic lower back pain-patient states he was jumping on a trampoline with his son about 8/9 years ago and he slipped a disc and had significant pain. He did end up seeing neurosurgery, Dr. Preston.  They did not recommend surgery at that time but said he might need it eventually. He states his symptoms improved significantly and he had not had any issues.  Unfortunately, over the last several months he has had some a burning pain and numbness in his right leg. He states it is better if he is moving around. Patient states he did aggravate his back a few weeks ago bending over.  Seems to be improving some.  Denies any loss of bowel or bladder function.  Will get a new MRI of his lumbar spine.  Discussed patient he may have to go back to see Dr. Preston.    Orders:  -     MRI Lumbar Spine Without Contrast; Future    4. Personal history of tobacco use  Assessment & Plan:  Patient denies about a pack per day and has for the last 30 years.  He states he is trying to quit.  He is down to smoking about 6 cigarettes a day.  Recommend continued smoking cessation.  Will get low-dose CT as well.    Orders:  -      CT Chest Low Dose Cancer Screening WO; Future    5. Elevated blood pressure reading  Assessment & Plan:  Patient reports his blood pressure typically runs around 120/80  Recommend he continue to monitor it periodically.       6. Encounter for screening for malignant neoplasm of colon  -     Ambulatory Referral For Screening Colonoscopy    7. Screening for prostate cancer  -     PSA Screen; Future    8. Screening for lipid disorders  -     Lipid Panel; Future    9. Routine lab draw  -     CBC & Differential; Future  -     Comprehensive Metabolic Panel; Future  -     Urinalysis With Microscopic - Urine, Clean Catch; Future  -     TSH Rfx On Abnormal To Free T4; Future    10. Neuropathy  -     MRI Lumbar Spine Without Contrast; Future         Tobacco Use: High Risk (1/5/2024)    Patient History     Smoking Tobacco Use: Every Day     Smokeless Tobacco Use: Never     Passive Exposure: Not on file       Follow Up     Return in about 7 months (around 8/5/2024) for Annual physical.    Please note that portions of this note were completed with a voice recognition program.    Patient was given instructions and counseling regarding his condition or for health maintenance advice.  Please see specific information pulled into the AVS if appropriate.   I have reviewed information obtained and documented by others and I have confirmed the accuracy of this documented note.    Ksenia Mazarigeos, APRN

## 2024-01-05 NOTE — ASSESSMENT & PLAN NOTE
Patient denies about a pack per day and has for the last 30 years.  He states he is trying to quit.  He is down to smoking about 6 cigarettes a day.  Recommend continued smoking cessation.  Will get low-dose CT as well.

## 2024-01-19 ENCOUNTER — PATIENT ROUNDING (BHMG ONLY) (OUTPATIENT)
Dept: INTERNAL MEDICINE | Facility: CLINIC | Age: 54
End: 2024-01-19
Payer: COMMERCIAL

## 2024-05-13 ENCOUNTER — LAB (OUTPATIENT)
Dept: INTERNAL MEDICINE | Age: 54
End: 2024-05-13
Payer: COMMERCIAL

## 2024-05-13 DIAGNOSIS — R73.09 ELEVATED GLUCOSE LEVEL: ICD-10-CM

## 2024-05-13 DIAGNOSIS — Z13.220 SCREENING FOR LIPID DISORDERS: ICD-10-CM

## 2024-05-13 DIAGNOSIS — Z12.5 SCREENING FOR PROSTATE CANCER: ICD-10-CM

## 2024-05-13 DIAGNOSIS — Z01.89 ROUTINE LAB DRAW: ICD-10-CM

## 2024-05-13 LAB
ALBUMIN SERPL-MCNC: 4.4 G/DL (ref 3.5–5.2)
ALBUMIN/GLOB SERPL: 2.1 G/DL
ALP SERPL-CCNC: 70 U/L (ref 39–117)
ALT SERPL W P-5'-P-CCNC: 16 U/L (ref 1–41)
ANION GAP SERPL CALCULATED.3IONS-SCNC: 10.2 MMOL/L (ref 5–15)
AST SERPL-CCNC: 13 U/L (ref 1–40)
BASOPHILS # BLD AUTO: 0.05 10*3/MM3 (ref 0–0.2)
BASOPHILS NFR BLD AUTO: 0.6 % (ref 0–1.5)
BILIRUB SERPL-MCNC: 0.3 MG/DL (ref 0–1.2)
BUN SERPL-MCNC: 16 MG/DL (ref 6–20)
BUN/CREAT SERPL: 16.8 (ref 7–25)
CALCIUM SPEC-SCNC: 9 MG/DL (ref 8.6–10.5)
CHLORIDE SERPL-SCNC: 106 MMOL/L (ref 98–107)
CHOLEST SERPL-MCNC: 152 MG/DL (ref 0–200)
CO2 SERPL-SCNC: 24.8 MMOL/L (ref 22–29)
CREAT SERPL-MCNC: 0.95 MG/DL (ref 0.76–1.27)
DEPRECATED RDW RBC AUTO: 42.4 FL (ref 37–54)
EGFRCR SERPLBLD CKD-EPI 2021: 95.7 ML/MIN/1.73
EOSINOPHIL # BLD AUTO: 0.1 10*3/MM3 (ref 0–0.4)
EOSINOPHIL NFR BLD AUTO: 1.2 % (ref 0.3–6.2)
ERYTHROCYTE [DISTWIDTH] IN BLOOD BY AUTOMATED COUNT: 12.8 % (ref 12.3–15.4)
GLOBULIN UR ELPH-MCNC: 2.1 GM/DL
GLUCOSE SERPL-MCNC: 129 MG/DL (ref 65–99)
HCT VFR BLD AUTO: 44.4 % (ref 37.5–51)
HDLC SERPL-MCNC: 48 MG/DL (ref 40–60)
HGB BLD-MCNC: 14.9 G/DL (ref 13–17.7)
IMM GRANULOCYTES # BLD AUTO: 0.02 10*3/MM3 (ref 0–0.05)
IMM GRANULOCYTES NFR BLD AUTO: 0.2 % (ref 0–0.5)
LDLC SERPL CALC-MCNC: 90 MG/DL (ref 0–100)
LDLC/HDLC SERPL: 1.88 {RATIO}
LYMPHOCYTES # BLD AUTO: 1.99 10*3/MM3 (ref 0.7–3.1)
LYMPHOCYTES NFR BLD AUTO: 24.7 % (ref 19.6–45.3)
MCH RBC QN AUTO: 30.3 PG (ref 26.6–33)
MCHC RBC AUTO-ENTMCNC: 33.6 G/DL (ref 31.5–35.7)
MCV RBC AUTO: 90.2 FL (ref 79–97)
MONOCYTES # BLD AUTO: 0.56 10*3/MM3 (ref 0.1–0.9)
MONOCYTES NFR BLD AUTO: 6.9 % (ref 5–12)
NEUTROPHILS NFR BLD AUTO: 5.34 10*3/MM3 (ref 1.7–7)
NEUTROPHILS NFR BLD AUTO: 66.4 % (ref 42.7–76)
NRBC BLD AUTO-RTO: 0 /100 WBC (ref 0–0.2)
PLATELET # BLD AUTO: 327 10*3/MM3 (ref 140–450)
PMV BLD AUTO: 11.5 FL (ref 6–12)
POTASSIUM SERPL-SCNC: 3.9 MMOL/L (ref 3.5–5.2)
PROT SERPL-MCNC: 6.5 G/DL (ref 6–8.5)
PSA SERPL-MCNC: 1.96 NG/ML (ref 0–4)
RBC # BLD AUTO: 4.92 10*6/MM3 (ref 4.14–5.8)
SODIUM SERPL-SCNC: 141 MMOL/L (ref 136–145)
TRIGL SERPL-MCNC: 69 MG/DL (ref 0–150)
TSH SERPL DL<=0.05 MIU/L-ACNC: 1.6 UIU/ML (ref 0.27–4.2)
VLDLC SERPL-MCNC: 14 MG/DL (ref 5–40)
WBC NRBC COR # BLD AUTO: 8.06 10*3/MM3 (ref 3.4–10.8)

## 2024-05-13 PROCEDURE — 83036 HEMOGLOBIN GLYCOSYLATED A1C: CPT

## 2024-05-13 PROCEDURE — G0103 PSA SCREENING: HCPCS

## 2024-05-13 PROCEDURE — 80050 GENERAL HEALTH PANEL: CPT

## 2024-05-13 PROCEDURE — 36415 COLL VENOUS BLD VENIPUNCTURE: CPT

## 2024-05-13 PROCEDURE — 80061 LIPID PANEL: CPT

## 2024-05-14 DIAGNOSIS — R73.09 ELEVATED GLUCOSE LEVEL: Primary | ICD-10-CM

## 2024-05-14 LAB — HBA1C MFR BLD: 5.6 % (ref 4.8–5.6)

## 2025-01-27 ENCOUNTER — OFFICE VISIT (OUTPATIENT)
Dept: INTERNAL MEDICINE | Age: 55
End: 2025-01-27
Payer: COMMERCIAL

## 2025-01-27 VITALS
BODY MASS INDEX: 18.47 KG/M2 | SYSTOLIC BLOOD PRESSURE: 110 MMHG | DIASTOLIC BLOOD PRESSURE: 70 MMHG | RESPIRATION RATE: 18 BRPM | HEART RATE: 88 BPM | OXYGEN SATURATION: 99 % | HEIGHT: 72 IN | TEMPERATURE: 98.6 F | WEIGHT: 136.4 LBS

## 2025-01-27 DIAGNOSIS — F33.1 MODERATE EPISODE OF RECURRENT MAJOR DEPRESSIVE DISORDER: Primary | ICD-10-CM

## 2025-01-27 DIAGNOSIS — J06.9 ACUTE URI: ICD-10-CM

## 2025-01-27 PROCEDURE — 99214 OFFICE O/P EST MOD 30 MIN: CPT

## 2025-01-27 RX ORDER — ESCITALOPRAM OXALATE 10 MG/1
10 TABLET ORAL DAILY
Qty: 30 TABLET | Refills: 2 | Status: SHIPPED | OUTPATIENT
Start: 2025-01-27

## 2025-01-27 RX ORDER — DOXYCYCLINE 100 MG/1
100 CAPSULE ORAL 2 TIMES DAILY
Qty: 20 CAPSULE | Refills: 0 | Status: SHIPPED | OUTPATIENT
Start: 2025-01-27

## 2025-01-27 NOTE — ASSESSMENT & PLAN NOTE
Patient admits that he is down and depressed and this has been going on for several years. He states that it is affecting his life.  He is tearful today in the office.  He states that he is apprehensive about medications due to possible side effects.  He states that his mind is constantly racing.  He denies wanting to harm himself but does admit to intrusive thoughts.  He states that he will not hurt himself or anybody else.  He states that his mood is affecting his home life and work.  States that he does not feel like himself and has difficulty sleeping.  He is never been on anything for depression in the past.  Patient is agreeable to discuss going to therapy.  Will start patient on Lexapro 10 mg daily.  Risks and benefits of medication were discussed with the patient.  He may also take something like Tylenol PM or melatonin to see if that helps him rest.  Will follow-up with him in a few weeks.  Referral to psych placed.  If patient develops any worsening thoughts he should seek medical attention right away.  Patient is agreeable.

## 2025-01-27 NOTE — PROGRESS NOTES
Chief Complaint  URI (54 year old male here today complaining of URI. States sinus pressure and runny nose. Seen at [facility] 2 weeks ago and Covid and Flu were both negative, but is still not feeling any better. States they gave him cough syrup but he does not have a cough. )    History of Present Illness  SUBJECTIVE  Carlos Alberto Juan presents to [facility]   History of Present Illness  The patient presents for evaluation of an upper respiratory infection and to discuss anxiety and depression.    He reports a slight improvement in his condition approximately one week ago, but experienced a sudden deterioration last Friday. He was prescribed Augmentin at an urgent care facility, which provided some relief, but did not completely alleviate his symptoms. He does not experience fever, shortness of breath, or chest pain. His temperature was recorded as 99.9 degrees this morning, which he attributes to being in bed, and it normalized upon rechecking 30 minutes later. His cough is minimal, with the primary symptom being postnasal drainage. He also reports severe nausea, intense headaches, ear congestion, and body aches. He suspects these symptoms are indicative of an upper respiratory infection that has recently exacerbated. He has been self-medicating with [place] Cold and Cough, which provides temporary relief but does not significantly improve his condition. He is able to expectorate clear mucus, but notes that it turns yellowish-green in the mornings. He does not experience tenderness or pressure around his eyes, but does report a sensation of pressure behind his eyes and photophobia. He has not undergone any swab tests today. He also reports significant postnasal drainage, but no sore throat.    Patient would like to also discuss anxiety and depression. Patient admits that he is down and depressed and this has been going on for several years. He states that it is  "affecting his life.  He is tearful today in the office.  He states that he is apprehensive about medications due to possible side effects.  He states that his mind is constantly racing.  He denies wanting to harm himself but does admit to intrusive thoughts.  He states that he will not hurt himself or anybody else.  He states that his mood is affecting his home life and work.  States that he does not feel like himself and has difficulty sleeping.  He is never been on anything for depression in the past.  Patient is agreeable to discuss going to therapy.    Supplemental Information  He has a past medical history of migraines, for which he sought medical attention, but found that the prescribed treatments exacerbated his symptoms. He has since discontinued these treatments.        History reviewed. No pertinent past medical history.   History reviewed. No pertinent family history.   Past Surgical History:   Procedure Laterality Date    APPENDECTOMY          Current Outpatient Medications:     doxycycline (VIBRAMYCIN) 100 MG capsule, Take 1 capsule by mouth 2 (Two) Times a Day., Disp: 20 capsule, Rfl: 0    escitalopram (Lexapro) 10 MG tablet, Take 1 tablet by mouth Daily., Disp: 30 tablet, Rfl: 2    OBJECTIVE  Vital Signs:   /70 (BP Location: Left arm, Patient Position: Sitting)   Pulse 88   Temp 98.6 °F (37 °C) (Temporal)   Resp 18   Ht 182.9 cm (72.01\")   Wt 61.9 kg (136 lb 6.4 oz)   SpO2 99%   BMI 18.49 kg/m²    Estimated body mass index is 18.49 kg/m² as calculated from the following:    Height as of this encounter: 182.9 cm (72.01\").    Weight as of this encounter: 61.9 kg (136 lb 6.4 oz).     Wt Readings from Last 3 Encounters:   01/27/25 61.9 kg (136 lb 6.4 oz)   01/05/24 69.1 kg (152 lb 6.4 oz)   08/12/23 67.9 kg (149 lb 9.6 oz)     BP Readings from Last 3 Encounters:   01/27/25 110/70   01/05/24 130/86   08/12/23 137/83       Physical Exam  Vitals and nursing note reviewed.   Constitutional:       " Appearance: Normal appearance.   HENT:      Head: Normocephalic and atraumatic.      Right Ear: Tympanic membrane normal. A middle ear effusion is present.      Left Ear: Tympanic membrane normal. A middle ear effusion is present.      Nose: Congestion present.      Right Sinus: Maxillary sinus tenderness present.      Left Sinus: Maxillary sinus tenderness present.   Eyes:      Extraocular Movements: Extraocular movements intact.      Conjunctiva/sclera: Conjunctivae normal.   Cardiovascular:      Rate and Rhythm: Normal rate and regular rhythm.      Heart sounds: Normal heart sounds.   Pulmonary:      Effort: Pulmonary effort is normal.      Breath sounds: Normal breath sounds.   Abdominal:      General: Abdomen is flat. Bowel sounds are normal. There is no distension.      Palpations: Abdomen is soft. There is no mass.      Tenderness: There is no abdominal tenderness. There is no right CVA tenderness, left CVA tenderness, guarding or rebound.      Hernia: No hernia is present.   Musculoskeletal:         General: Normal range of motion.      Cervical back: Normal range of motion.   Skin:     General: Skin is warm and dry.   Neurological:      General: No focal deficit present.      Mental Status: He is alert and oriented to person, place, and time. Mental status is at baseline.   Psychiatric:         Mood and Affect: Mood normal.         Behavior: Behavior normal.         Thought Content: Thought content normal.         Judgment: Judgment normal.          Result Review        No Images in the past 120 days found..     The above data has been reviewed by MARY Rosas 01/27/2025 10:49 EST.          Patient Care Team:  Ksenia Mazariegos APRN as PCP - General (Internal Medicine)    BMI is below normal parameters (malnutrition). Recommendations: treating the underlying disease process       ASSESSMENT & PLAN    Diagnoses and all orders for this visit:    1. Moderate episode of recurrent major depressive disorder  (Primary)  Assessment & Plan:  Patient admits that he is down and depressed and this has been going on for several years. He states that it is affecting his life.  He is tearful today in the office.  He states that he is apprehensive about medications due to possible side effects.  He states that his mind is constantly racing.  He denies wanting to harm himself but does admit to intrusive thoughts.  He states that he will not hurt himself or anybody else.  He states that his mood is affecting his home life and work.  States that he does not feel like himself and has difficulty sleeping.  He is never been on anything for depression in the past.  Patient is agreeable to discuss going to therapy.  Will start patient on Lexapro 10 mg daily.  Risks and benefits of medication were discussed with the patient.  He may also take something like Tylenol PM or melatonin to see if that helps him rest.  Will follow-up with him in a few weeks.  Referral to psych placed.  If patient develops any worsening thoughts he should seek medical attention right away.  Patient is agreeable.    Orders:  -     Ambulatory Referral to Psychiatry    2. Acute URI  -     doxycycline (VIBRAMYCIN) 100 MG capsule; Take 1 capsule by mouth 2 (Two) Times a Day.  Dispense: 20 capsule; Refill: 0    Other orders  -     escitalopram (Lexapro) 10 MG tablet; Take 1 tablet by mouth Daily.  Dispense: 30 tablet; Refill: 2         Assessment & Plan  1. Upper respiratory infection.  The patient reports worsening symptoms since Friday, including nasal drainage, nausea, headache, ear plugging, and body aches. He has been using [place] Cold and Cough, which only provides minimal relief. He was previously prescribed Augmentin at urgent care, which did not resolve the symptoms. He does not have a fever, shortness of breath, or chest pain. The nasal drainage is clear during the day but yellowish-green in the mornings. There is no tenderness or pressure above or  below the eyes, but he feels pressure behind the eyes and light sensitivity. He has a history of migraines but reports no current migraine symptoms.  We did consider giving him a round of corticosteroids; however, patient is very sensitive and believes that they will make him too anxious.  Will start patient on doxycycline 100 mg twice daily for 10 days.  I also recommended he continue the Xyzal and add in Flonase.  He may take Mucinex as well.    2.  Depression  Patient will start Lexapro 10 mg daily.  Referral to psychiatry also placed.      Tobacco Use: High Risk (1/27/2025)    Patient History     Smoking Tobacco Use: Every Day     Smokeless Tobacco Use: Never     Passive Exposure: Not on file       Follow Up     Return in about 3 weeks (around 2/17/2025) for Annual physical.    Please note that portions of this note were completed with a voice recognition program.    Patient was given instructions and counseling regarding his condition or for health maintenance advice. Please see specific information pulled into the AVS if appropriate.   I have reviewed information obtained and documented by others and I have confirmed the accuracy of this documented note.    MARY Rosas    Patient or patient representative verbalized consent for the use of Ambient Listening during the visit with  MARY Rosas for chart documentation. 1/27/2025  12:32 EST

## 2025-02-06 ENCOUNTER — OFFICE VISIT (OUTPATIENT)
Dept: INTERNAL MEDICINE | Age: 55
End: 2025-02-06
Payer: COMMERCIAL

## 2025-02-06 VITALS
BODY MASS INDEX: 20.26 KG/M2 | OXYGEN SATURATION: 100 % | DIASTOLIC BLOOD PRESSURE: 80 MMHG | SYSTOLIC BLOOD PRESSURE: 126 MMHG | HEART RATE: 54 BPM | WEIGHT: 149.6 LBS | TEMPERATURE: 98.4 F | HEIGHT: 72 IN

## 2025-02-06 DIAGNOSIS — J40 BRONCHITIS: Primary | ICD-10-CM

## 2025-02-06 DIAGNOSIS — J06.9 UPPER RESPIRATORY TRACT INFECTION, UNSPECIFIED TYPE: ICD-10-CM

## 2025-02-06 PROCEDURE — 99214 OFFICE O/P EST MOD 30 MIN: CPT | Performed by: NURSE PRACTITIONER

## 2025-02-06 RX ORDER — GUAIFENESIN 600 MG/1
1200 TABLET, EXTENDED RELEASE ORAL 2 TIMES DAILY
Qty: 30 TABLET | Refills: 0 | Status: SHIPPED | OUTPATIENT
Start: 2025-02-06

## 2025-02-06 RX ORDER — ALBUTEROL SULFATE 90 UG/1
2 INHALANT RESPIRATORY (INHALATION) EVERY 4 HOURS PRN
Qty: 6.7 G | Refills: 0 | Status: SHIPPED | OUTPATIENT
Start: 2025-02-06

## 2025-02-06 RX ORDER — AZITHROMYCIN 250 MG/1
TABLET, FILM COATED ORAL
Qty: 6 TABLET | Refills: 0 | Status: SHIPPED | OUTPATIENT
Start: 2025-02-06

## 2025-02-06 NOTE — PATIENT INSTRUCTIONS
Isolate as long as a fever, with exposure to flu patient may very well have flu however outside the window for Tamiflu  Force fluids  Rest  Try not to smoke  Work note 48 hours

## 2025-02-06 NOTE — PROGRESS NOTES
"Chief Complaint  Illness (Exposed to flu having symptoms. Bad headaches, chills, body aches, vomiting very tired.Started earlier this week)    Subjective      Carlos Alberto Juan is a 54 y.o. male who presents to [facility]     History of Present Illness  The patient presents for evaluation of cough congestion chills body ache and headache.  His son tested positive for the flu last week.    He is a smoker and has been experiencing symptoms consistent with upper respiratory infection including a low-grade fever of 99.5°F recorded this morning. He reports no sore throat but admits to feeling short of breath after exertion, such as walking to retrieve his trash cans.  Cough that is productive thin yellow secretions.  Occasional wheezing headache chills and bodyaches.  His son was recently diagnosed with influenza. 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  Despite taking [employer] Cold and Flu, he reports no significant relief from his symptoms. He also takes Xyzal daily.       SOCIAL HISTORY  He admits to smoking. 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    MEDICATIONS  Current: [employer] cold and flu, Xyzal  Past: Z-Justus         Objective   Vital Signs:   Vitals:    02/06/25 0928   BP: 126/80   Pulse: 54   Temp: 98.4 °F (36.9 °C)   TempSrc: Skin   SpO2: 100%   Weight: 67.9 kg (149 lb 9.6 oz)   Height: 182.9 cm (72.01\")     Body mass index is 20.28 kg/m².    Wt Readings from Last 3 Encounters:   02/06/25 67.9 kg (149 lb 9.6 oz)   01/27/25 61.9 kg (136 lb 6.4 oz)   01/05/24 69.1 kg (152 lb 6.4 oz)     BP Readings from Last 3 Encounters:   02/06/25 126/80   01/27/25 110/70   01/05/24 130/86       Health Maintenance   Topic Date Due   • COLORECTAL CANCER SCREENING  Never done   • HEPATITIS C SCREENING  Never done   • ANNUAL PHYSICAL  Never done   • Pneumococcal Vaccine 0-64 (1 of 2 - PCV) 02/17/2025 (Originally 5/11/1989)   • INFLUENZA " VACCINE  02/17/2025 (Originally 7/1/2024)   • ZOSTER VACCINE (1 of 2) 02/17/2025 (Originally 5/11/2021)   • COVID-19 Vaccine (3 - 2024-25 season) 04/18/2025 (Originally 9/1/2024)   • TDAP/TD VACCINES (2 - Td or Tdap) 09/16/2032       History reviewed. No pertinent past medical history.  Past Surgical History:   Procedure Laterality Date   • APPENDECTOMY       History reviewed. No pertinent family history.  Social History     Socioeconomic History   • Marital status:    Tobacco Use   • Smoking status: Every Day     Current packs/day: 0.25     Average packs/day: 0.3 packs/day for 30.1 years (7.5 ttl pk-yrs)     Types: Cigarettes     Start date: 1995   • Smokeless tobacco: Never   Vaping Use   • Vaping status: Some Days   • Substances: Nicotine (sometimes)   Substance and Sexual Activity   • Alcohol use: Never   • Drug use: Never   • Sexual activity: Defer       Current Outpatient Medications   Medication Instructions   • albuterol sulfate  (90 Base) MCG/ACT inhaler 2 puffs, Inhalation, Every 4 Hours PRN   • azithromycin (Zithromax Z-Justus) 250 MG tablet Take 2 tablets by mouth on day 1, then 1 tablet daily on days 2-5   • doxycycline (VIBRAMYCIN) 100 mg, Oral, 2 Times Daily   • escitalopram (LEXAPRO) 10 mg, Oral, Daily   • guaiFENesin (MUCINEX) 1,200 mg, Oral, 2 Times Daily       There are no discontinued medications.     Physical Exam  Vitals and nursing note reviewed.   Constitutional:       Appearance: Normal appearance.   HENT:      Head: Normocephalic.   Eyes:      Extraocular Movements: Extraocular movements intact.      Pupils: Pupils are equal, round, and reactive to light.   Cardiovascular:      Rate and Rhythm: Normal rate and regular rhythm.      Pulses: Normal pulses.   Pulmonary:      Effort: Pulmonary effort is normal.      Comments: Bilateral breath sounds with inspiratory wheeze noted on right equal throughout  Abdominal:      Palpations: Abdomen is soft.   Musculoskeletal:         General:  Normal range of motion.      Cervical back: Normal range of motion.   Skin:     General: Skin is warm and dry.   Neurological:      General: No focal deficit present.      Mental Status: He is alert.   Psychiatric:         Mood and Affect: Mood normal.         Behavior: Behavior normal.         Thought Content: Thought content normal.        Physical Exam         Result Review :  The following data was reviewed by: MARY Kohler on 02/06/2025:    Labs  No visits with results within 2 Month(s) from this visit.   Latest known visit with results is:   Lab on 05/13/2024   Component Date Value Ref Range Status   • PSA 05/13/2024 1.960  0.000 - 4.000 ng/mL Final   • TSH 05/13/2024 1.600  0.270 - 4.200 uIU/mL Final   • Total Cholesterol 05/13/2024 152  0 - 200 mg/dL Final   • Triglycerides 05/13/2024 69  0 - 150 mg/dL Final   • HDL Cholesterol 05/13/2024 48  40 - 60 mg/dL Final   • LDL Cholesterol  05/13/2024 90  0 - 100 mg/dL Final   • VLDL Cholesterol 05/13/2024 14  5 - 40 mg/dL Final   • LDL/HDL Ratio 05/13/2024 1.88   Final   • Glucose 05/13/2024 129 (H)  65 - 99 mg/dL Final   • BUN 05/13/2024 16  6 - 20 mg/dL Final   • Creatinine 05/13/2024 0.95  0.76 - 1.27 mg/dL Final   • Sodium 05/13/2024 141  136 - 145 mmol/L Final   • Potassium 05/13/2024 3.9  3.5 - 5.2 mmol/L Final   • Chloride 05/13/2024 106  98 - 107 mmol/L Final   • CO2 05/13/2024 24.8  22.0 - 29.0 mmol/L Final   • Calcium 05/13/2024 9.0  8.6 - 10.5 mg/dL Final   • Total Protein 05/13/2024 6.5  6.0 - 8.5 g/dL Final   • Albumin 05/13/2024 4.4  3.5 - 5.2 g/dL Final   • ALT (SGPT) 05/13/2024 16  1 - 41 U/L Final   • AST (SGOT) 05/13/2024 13  1 - 40 U/L Final   • Alkaline Phosphatase 05/13/2024 70  39 - 117 U/L Final   • Total Bilirubin 05/13/2024 0.3  0.0 - 1.2 mg/dL Final   • Globulin 05/13/2024 2.1  gm/dL Final   • A/G Ratio 05/13/2024 2.1  g/dL Final   • BUN/Creatinine Ratio 05/13/2024 16.8  7.0 - 25.0 Final   • Anion Gap 05/13/2024 10.2  5.0 -  15.0 mmol/L Final   • eGFR 05/13/2024 95.7  >60.0 mL/min/1.73 Final   • WBC 05/13/2024 8.06  3.40 - 10.80 10*3/mm3 Final   • RBC 05/13/2024 4.92  4.14 - 5.80 10*6/mm3 Final   • Hemoglobin 05/13/2024 14.9  13.0 - 17.7 g/dL Final   • Hematocrit 05/13/2024 44.4  37.5 - 51.0 % Final   • MCV 05/13/2024 90.2  79.0 - 97.0 fL Final   • MCH 05/13/2024 30.3  26.6 - 33.0 pg Final   • MCHC 05/13/2024 33.6  31.5 - 35.7 g/dL Final   • RDW 05/13/2024 12.8  12.3 - 15.4 % Final   • RDW-SD 05/13/2024 42.4  37.0 - 54.0 fl Final   • MPV 05/13/2024 11.5  6.0 - 12.0 fL Final   • Platelets 05/13/2024 327  140 - 450 10*3/mm3 Final   • Neutrophil % 05/13/2024 66.4  42.7 - 76.0 % Final   • Lymphocyte % 05/13/2024 24.7  19.6 - 45.3 % Final   • Monocyte % 05/13/2024 6.9  5.0 - 12.0 % Final   • Eosinophil % 05/13/2024 1.2  0.3 - 6.2 % Final   • Basophil % 05/13/2024 0.6  0.0 - 1.5 % Final   • Immature Grans % 05/13/2024 0.2  0.0 - 0.5 % Final   • Neutrophils, Absolute 05/13/2024 5.34  1.70 - 7.00 10*3/mm3 Final   • Lymphocytes, Absolute 05/13/2024 1.99  0.70 - 3.10 10*3/mm3 Final   • Monocytes, Absolute 05/13/2024 0.56  0.10 - 0.90 10*3/mm3 Final   • Eosinophils, Absolute 05/13/2024 0.10  0.00 - 0.40 10*3/mm3 Final   • Basophils, Absolute 05/13/2024 0.05  0.00 - 0.20 10*3/mm3 Final   • Immature Grans, Absolute 05/13/2024 0.02  0.00 - 0.05 10*3/mm3 Final   • nRBC 05/13/2024 0.0  0.0 - 0.2 /100 WBC Final   • Hemoglobin A1C 05/13/2024 5.60  4.80 - 5.60 % Final       Imaging  No Images in the past 120 days found..    Results  Laboratory Studies  Influenza and COVID-19 tests were negative.       Procedures       ASSESSMENT & PLAN  Diagnoses and all orders for this visit:    1. Bronchitis (Primary)  -     azithromycin (Zithromax Z-Justus) 250 MG tablet; Take 2 tablets by mouth on day 1, then 1 tablet daily on days 2-5  Dispense: 6 tablet; Refill: 0  -     albuterol sulfate  (90 Base) MCG/ACT inhaler; Inhale 2 puffs Every 4 (Four) Hours As Needed  for Wheezing or Shortness of Air.  Dispense: 6.7 g; Refill: 0  -     guaiFENesin (Mucinex) 600 MG 12 hr tablet; Take 2 tablets by mouth 2 (Two) Times a Day.  Dispense: 30 tablet; Refill: 0    2. Upper respiratory tract infection, unspecified type         Assessment & Plan  1. Bronchitis.  Tests for influenza and COVID-19 have returned negative results, but these tests are not 100% accurate. Given the current prevalence of these viruses, and his close exposure it is possible that he may be infected with one of them however his timeframe of  symptoms make Tamiflu or Paxlovid not as effective. His oxygen levels are within normal range.  He is a smoker he has been advised to abstain from smoking, ensure adequate hydration, and get plenty of rest. He should isolate himself as long as he has a fever.  Consumption of fluids containing electrolytes such as Gatorade, Propel, Pedialyte, or chicken broth has been recommended.  He should continue taking Xyzal.  Increase rest.  A work note for 48 hours will be provided. An inhaler will be prescribed for use in case of wheezing or shortness of breath. A prescription for plain Mucinex will be sent to help thin out mucus. A Z-Justus will be prescribed, with instructions to take 2 tablets initially, followed by 1 tablet daily till completion. He has been advised to perform warm saltwater gargles and replace his toothbrush       BMI is within normal parameters. No other follow-up for BMI required.           FOLLOW UP  No follow-ups on file.  Patient was given instructions and counseling regarding his condition or for health maintenance advice. Please see specific information pulled into the AVS if appropriate.     Patient or patient representative verbalized consent for the use of Ambient Listening during the visit with  MARY Kohler for chart documentation. 2/6/2025  09:51 EST    MARY Kohler  02/06/25  09:52 EST

## 2025-02-19 ENCOUNTER — OFFICE VISIT (OUTPATIENT)
Dept: INTERNAL MEDICINE | Age: 55
End: 2025-02-19
Payer: COMMERCIAL

## 2025-02-19 VITALS
DIASTOLIC BLOOD PRESSURE: 70 MMHG | WEIGHT: 137.4 LBS | HEART RATE: 89 BPM | TEMPERATURE: 96.6 F | HEIGHT: 72 IN | SYSTOLIC BLOOD PRESSURE: 130 MMHG | BODY MASS INDEX: 18.61 KG/M2 | OXYGEN SATURATION: 98 %

## 2025-02-19 DIAGNOSIS — T78.40XA ALLERGIC REACTION, INITIAL ENCOUNTER: ICD-10-CM

## 2025-02-19 DIAGNOSIS — M54.50 ACUTE EXACERBATION OF CHRONIC LOW BACK PAIN: Primary | ICD-10-CM

## 2025-02-19 DIAGNOSIS — F32.A ANXIETY AND DEPRESSION: ICD-10-CM

## 2025-02-19 DIAGNOSIS — F41.9 ANXIETY AND DEPRESSION: ICD-10-CM

## 2025-02-19 DIAGNOSIS — Z87.891 PERSONAL HISTORY OF TOBACCO USE: ICD-10-CM

## 2025-02-19 DIAGNOSIS — G89.29 ACUTE EXACERBATION OF CHRONIC LOW BACK PAIN: Primary | ICD-10-CM

## 2025-02-19 PROCEDURE — 99214 OFFICE O/P EST MOD 30 MIN: CPT | Performed by: NURSE PRACTITIONER

## 2025-02-19 RX ORDER — CYCLOBENZAPRINE HCL 10 MG
10 TABLET ORAL 3 TIMES DAILY PRN
Qty: 21 TABLET | Refills: 0 | Status: SHIPPED | OUTPATIENT
Start: 2025-02-19

## 2025-02-19 RX ORDER — KETOROLAC TROMETHAMINE 30 MG/ML
60 INJECTION, SOLUTION INTRAMUSCULAR; INTRAVENOUS ONCE
Status: DISCONTINUED | OUTPATIENT
Start: 2025-02-19 | End: 2025-02-19

## 2025-02-19 RX ORDER — KETOROLAC TROMETHAMINE 30 MG/ML
30 INJECTION, SOLUTION INTRAMUSCULAR; INTRAVENOUS ONCE
Status: DISCONTINUED | OUTPATIENT
Start: 2025-02-19 | End: 2025-02-21

## 2025-02-19 RX ORDER — KETOROLAC TROMETHAMINE 10 MG/1
10 TABLET, FILM COATED ORAL EVERY 6 HOURS PRN
Qty: 21 TABLET | Refills: 0 | Status: SHIPPED | OUTPATIENT
Start: 2025-02-19 | End: 2025-02-24

## 2025-02-19 NOTE — PROGRESS NOTES
Chief Complaint  Back Pain (The patient is coming in complaining of back pain. He hurt it 10 years ago. He states in the last week he got sick and his back started hurting, and now its getting worse. He states he went to work yesterday and he is lifting all kinds of boxes lately. He states he takes ibuprofen and it wont touch the pain. ) and Rash (The patient states a that since taking the lexapro he has had a rash pop up. First noticed the rash about a week ago. )    Subjective      History of Present Illness  The patient presents for evaluation of back pain, rash, anxiety and depression.    He has a history of chronic back pain, which he attributes to an old injury sustained while jumping on a trampoline. Approximately 10 years ago, Dr. Preston advised him to manage the pain conservatively, which initially proved effective. However, he experienced a recent exacerbation of his back pain following an illness 2 weeks ago, characterized by body aches, including hip and joint discomfort. While the general body aches have resolved, his back pain has intensified to the point where he struggles with basic tasks such as standing and putting on socks. He sought medical attention for his respiratory symptoms and was tested for influenza. He reports that prolonged bed rest during his illness led to stiffness and pain. Despite recovery from the flu and resumption of normal activities, his back pain has progressively worsened, significantly impacting his daily functioning. He experiences severe pain when coughing or sneezing, which can cause him to lose his footing. The pain is primarily localized in the lower back, with some relief noted upon palpation. He also reports sciatic pain radiating down his right leg to the ankle, and for the first time, he is experiencing pain extending to his testicles. He has been managing his pain with ibuprofen, but it has become ineffective. He has tried using a back support without success. He  recalls a recent incident where he slipped on stairs but did not fall. His work involves lifting light boxes, and he is cautious about overexertion. He has been alternating between ibuprofen and Tylenol every few hours, but this regimen has also become ineffective. He has a history of an annular tear and disc bulge, but has not had a scan in the past 10 years. He has a follow-up appointment scheduled with Ksenia on 03/04/2025. Sitting exacerbates his pain.    He has developed a rash, which he suspects may be a side effect of Lexapro, prescribed for anxiety and depression. He has been on Lexapro since 02/06/2025 and was advised against abrupt discontinuation. He has attempted to alleviate the rash by changing his laundry detergent and soaps, but these measures have been unsuccessful. He has been taking Zoloft and is considering switching to it from Lexapro.    He reports equal levels of anxiety and depression, with significant sleep disturbances and morning anxiety. He has not tried any other medications for his anxiety.    SOCIAL HISTORY  The patient admits to smoking.    ALLERGIES  The patient has no known allergies.    MEDICATIONS  Current: ibuprofen, Lexapro, Zoloft       History reviewed. No pertinent past medical history.     Past Surgical History:   Procedure Laterality Date    APPENDECTOMY          Social History     Tobacco Use   Smoking Status Every Day    Current packs/day: 0.25    Average packs/day: 0.3 packs/day for 30.1 years (7.5 ttl pk-yrs)    Types: Cigarettes    Start date: 1995   Smokeless Tobacco Never        Patient Care Team:  Ksenia Mazariegos APRN as PCP - General (Internal Medicine)    Allergies   Allergen Reactions    Codeine Hives and Nausea And Vomiting    Lexapro [Escitalopram] Rash          Current Outpatient Medications:     albuterol sulfate  (90 Base) MCG/ACT inhaler, Inhale 2 puffs Every 4 (Four) Hours As Needed for Wheezing or Shortness of Air., Disp: 6.7 g, Rfl: 0     "cyclobenzaprine (FLEXERIL) 10 MG tablet, Take 1 tablet by mouth 3 (Three) Times a Day As Needed for Muscle Spasms., Disp: 21 tablet, Rfl: 0    ketorolac (TORADOL) 10 MG tablet, Take 1 tablet by mouth Every 6 (Six) Hours As Needed for Moderate Pain for up to 5 days. Take with food., Disp: 21 tablet, Rfl: 0    sertraline (Zoloft) 50 MG tablet, Take 1 tablet by mouth Daily., Disp: 30 tablet, Rfl: 0    Current Facility-Administered Medications:     ketorolac (TORADOL) injection 30 mg, 30 mg, Intramuscular, Once, Allison Lugo APRN    Objective     Vitals:    02/19/25 1350   BP: 130/70   BP Location: Left arm   Patient Position: Sitting   Cuff Size: Large Adult   Pulse: 89   Temp: 96.6 °F (35.9 °C)   TempSrc: Temporal   SpO2: 98%   Weight: 62.3 kg (137 lb 6.4 oz)   Height: 182.9 cm (72.01\")   PainSc: 8         Wt Readings from Last 3 Encounters:   02/19/25 62.3 kg (137 lb 6.4 oz)   02/06/25 67.9 kg (149 lb 9.6 oz)   01/27/25 61.9 kg (136 lb 6.4 oz)        BP Readings from Last 3 Encounters:   02/19/25 130/70   02/06/25 126/80   01/27/25 110/70        Physical Exam  Vitals reviewed.   Constitutional:       General: He is not in acute distress.  HENT:      Head: Normocephalic and atraumatic.   Pulmonary:      Effort: Pulmonary effort is normal.   Musculoskeletal:      Lumbar back: Tenderness and bony tenderness present.   Skin:     Findings: Rash present. Rash is papular.      Comments: Papular rash to chest and back.    Neurological:      General: No focal deficit present.      Mental Status: He is alert.   Psychiatric:         Speech: Speech normal.         Behavior: Behavior is cooperative.         Thought Content: Thought content normal.                Result Review   The following data was reviewed by: MARY Rain on 02/19/2025:  [x]  Tests & Results  []  Hospitalization/Emergency Department/Urgent Care  [x]  Internal/External Consultant Notes       Assessment and Plan     Diagnoses and all orders for " this visit:    1. Acute exacerbation of chronic low back pain (Primary)  -     Discontinue: ketorolac (TORADOL) injection 60 mg  -     ketorolac (TORADOL) injection 30 mg    2. Allergic reaction, initial encounter    3. Anxiety and depression    4. Personal history of tobacco use    Other orders  -     ketorolac (TORADOL) 10 MG tablet; Take 1 tablet by mouth Every 6 (Six) Hours As Needed for Moderate Pain for up to 5 days. Take with food.  Dispense: 21 tablet; Refill: 0  -     cyclobenzaprine (FLEXERIL) 10 MG tablet; Take 1 tablet by mouth 3 (Three) Times a Day As Needed for Muscle Spasms.  Dispense: 21 tablet; Refill: 0  -     sertraline (Zoloft) 50 MG tablet; Take 1 tablet by mouth Daily.  Dispense: 30 tablet; Refill: 0         Assessment & Plan  1. Back pain.  Urinalysis done in the office was negative for bladder bacteria.  The patient's renal function is within normal limits. He has been experiencing worsening back pain since his recent illness. The pain is severe, affecting his daily activities, and is not relieved by ibuprofen. A Toradol injection will be administered today. He is advised to take Toradol tablets, one every 6 hours for 5 days, with food starting tomorrow if he is still experiencing pain.  Flexeril 10 mg is prescribed to be taken to help with muscle relaxation and sleep. He is encouraged to continue with his stretching exercises. A work note will be provided for the rest of the week. If there is no improvement, an MRI will be considered.    2. Rash.  The rash is likely a side effect of Lexapro. He is advised to discontinue Lexapro immediately to see if the rash resolves.    3. Anxiety and depression.  He is prescribed Zoloft 50 mg to be taken at night. If Zoloft induces worsening of symptoms, he should discontinue its use.    4.  Tobacco use.  The patient was advised of the health risk related to smoking and advised to quit.  The patient declined and the assistance and states that he is not  ready to quit.     BMI is within normal parameters. No other follow-up for BMI required.     Patient was given instructions and counseling regarding his condition or for health maintenance advice. Please see specific information pulled into the AVS if appropriate.     Follow Up   Return for Next scheduled follow up, or if symptoms worsen or fail to improve.    Patient or patient representative verbalized consent for the use of Ambient Listening during the visit with  MARY Rain for chart documentation. 2/19/2025  14:15 EST    MARY Rain

## 2025-02-21 RX ORDER — KETOROLAC TROMETHAMINE 30 MG/ML
30 INJECTION, SOLUTION INTRAMUSCULAR; INTRAVENOUS EVERY 6 HOURS PRN
Status: SHIPPED | OUTPATIENT
Start: 2025-02-21 | End: 2025-02-26

## 2025-02-21 RX ADMIN — KETOROLAC TROMETHAMINE 60 MG: 30 INJECTION, SOLUTION INTRAMUSCULAR; INTRAVENOUS at 14:07

## 2025-03-04 ENCOUNTER — OFFICE VISIT (OUTPATIENT)
Dept: INTERNAL MEDICINE | Age: 55
End: 2025-03-04
Payer: COMMERCIAL

## 2025-03-04 ENCOUNTER — DOCUMENTATION (OUTPATIENT)
Dept: INTERNAL MEDICINE | Age: 55
End: 2025-03-04

## 2025-03-04 VITALS
OXYGEN SATURATION: 98 % | HEART RATE: 70 BPM | SYSTOLIC BLOOD PRESSURE: 118 MMHG | RESPIRATION RATE: 18 BRPM | BODY MASS INDEX: 20.05 KG/M2 | TEMPERATURE: 98.7 F | DIASTOLIC BLOOD PRESSURE: 82 MMHG | HEIGHT: 72 IN | WEIGHT: 148 LBS

## 2025-03-04 DIAGNOSIS — F17.210 CIGARETTE NICOTINE DEPENDENCE WITHOUT COMPLICATION: ICD-10-CM

## 2025-03-04 DIAGNOSIS — G89.29 CHRONIC BILATERAL LOW BACK PAIN WITH BILATERAL SCIATICA: ICD-10-CM

## 2025-03-04 DIAGNOSIS — Z00.00 ANNUAL PHYSICAL EXAM: Primary | ICD-10-CM

## 2025-03-04 DIAGNOSIS — F40.240 CLAUSTROPHOBIA: ICD-10-CM

## 2025-03-04 DIAGNOSIS — M54.41 CHRONIC BILATERAL LOW BACK PAIN WITH BILATERAL SCIATICA: ICD-10-CM

## 2025-03-04 DIAGNOSIS — Z12.11 SCREENING FOR COLON CANCER: ICD-10-CM

## 2025-03-04 DIAGNOSIS — R06.83 SNORING: ICD-10-CM

## 2025-03-04 DIAGNOSIS — M54.42 CHRONIC BILATERAL LOW BACK PAIN WITH BILATERAL SCIATICA: ICD-10-CM

## 2025-03-04 DIAGNOSIS — Z12.5 SCREENING FOR PROSTATE CANCER: ICD-10-CM

## 2025-03-04 DIAGNOSIS — Z11.59 NEED FOR HEPATITIS C SCREENING TEST: ICD-10-CM

## 2025-03-04 DIAGNOSIS — G47.39 SLEEP APNEA-LIKE BEHAVIOR: ICD-10-CM

## 2025-03-04 RX ORDER — CYCLOBENZAPRINE HCL 10 MG
10 TABLET ORAL 3 TIMES DAILY PRN
Qty: 30 TABLET | Refills: 0 | Status: SHIPPED | OUTPATIENT
Start: 2025-03-04

## 2025-03-04 RX ORDER — DIAZEPAM 5 MG/1
5 TABLET ORAL ONCE
Qty: 1 TABLET | Refills: 0 | Status: SHIPPED | OUTPATIENT
Start: 2025-03-04 | End: 2025-03-04

## 2025-03-04 NOTE — PROGRESS NOTES
Chief Complaint  Annual Exam (54 year old male here today for his annual CPE.) and Anxiety (States he had an allergic reaction to Lexapro and seen Allison and was started on Zoloft and reports he is doing better and is tolerating the medication well. )    History of Present Illness  SUBJECTIVE  Carlos Alberto Juan presents to [facility] for his annual physical exam.    History of Present Illness  The patient presents for a physical exam and follow-up on anxiety, back pain, and sleep apnea.    He was previously prescribed Lexapro for anxiety but experienced an allergic reaction manifesting as a rash. Despite changing laundry detergents and thoroughly cleaning his belongings, the rash persisted. He sought medical attention from a nurse practitioner, Allison, who attributed the rash to a respiratory illness he had concurrently. He has been taking Zoloft for anxiety but reports no significant improvement. He experiences anxiety at night, often waking up in a state of distress.    He has a history of back problems dating back 10 to 12 years, which have been managed with over-the-counter pain relievers such as ibuprofen and Tylenol. Recently, he fell down five steps while wearing socks, resulting in severe back pain that rendered him bedridden the following day. He received a Toradol injection and a five-day course of oral Toradol, which provided significant relief. However, he reports that Tylenol and ibuprofen have been ineffective in managing his current pain. Prior to the fall, he experienced radiating pain down his right buttock and leg, accompanied by a numb sensation. Post-fall, he reports bilateral buttock and leg pain, and a sensation of being squeezed in his testicles. He does not experience pain during urination or loss of bowel or bladder control. He is interested in further investigation to determine if he sustained additional injuries in the fall. He is claustrophobic and  requires sedation for MRIs. He avoids prescription pain medications due to side effects of jitteriness and nausea. He uses muscle relaxers sparingly, only when in severe pain, as they provide some relief. His symptoms fluctuate in intensity, influenced by his level of physical activity, particularly walking.    His wife suspects he may have sleep apnea, as she has observed episodes of apnea during his sleep. He prefers to undergo a home sleep study rather than one at a sleep center.    He is due for colon cancer screening. He has no family history of colon cancer. He is a smoker, currently smoking half a pack a day, down from his previous habit of 1 to 1.5 packs a day over the past 20 to 30 years. He is attempting to quit smoking. He has not received the influenza, COVID-19, or pneumonia vaccines this year. He is up to date on his tetanus vaccine. He reports no chest pain, shortness of breath, irregular heartbeats, or swelling. He feels generally well, except for his back pain.    SOCIAL HISTORY  The patient admits to smoking about half a pack a day and is trying to quit. He previously smoked about a pack and a half a day for 20 to 30 years.    FAMILY HISTORY  The patient reports no family history of colon cancer.    ALLERGIES  The patient had an allergic reaction to LEXAPRO, which caused a rash.    MEDICATIONS  Current: Zoloft, ibuprofen, Tylenol, muscle relaxers (as needed)  Discontinued: Lexapro    IMMUNIZATIONS  The patient is up to date on the tetanus vaccine.      History reviewed. No pertinent past medical history.   History reviewed. No pertinent family history.   Past Surgical History:   Procedure Laterality Date    APPENDECTOMY          Current Outpatient Medications:     albuterol sulfate  (90 Base) MCG/ACT inhaler, Inhale 2 puffs Every 4 (Four) Hours As Needed for Wheezing or Shortness of Air., Disp: 6.7 g, Rfl: 0    cyclobenzaprine (FLEXERIL) 10 MG tablet, Take 1 tablet by mouth 3 (Three) Times a  "Day As Needed for Muscle Spasms., Disp: 30 tablet, Rfl: 0    sertraline (Zoloft) 50 MG tablet, Take 1 tablet by mouth Daily., Disp: 30 tablet, Rfl: 0    diazePAM (Valium) 5 MG tablet, Take 1 tablet by mouth 1 time for 1 dose. Take 30 minutes to 1 hour prior to MRI., Disp: 1 tablet, Rfl: 0    diclofenac (VOLTAREN) 50 MG EC tablet, Take 1 tablet by mouth 2 (Two) Times a Day As Needed (back pain)., Disp: 60 tablet, Rfl: 0    OBJECTIVE  Vital Signs:   /82 (BP Location: Left arm, Patient Position: Sitting)   Pulse 70   Temp 98.7 °F (37.1 °C) (Temporal)   Resp 18   Ht 182.9 cm (72.01\")   Wt 67.1 kg (148 lb)   SpO2 98%   BMI 20.07 kg/m²    Estimated body mass index is 20.07 kg/m² as calculated from the following:    Height as of this encounter: 182.9 cm (72.01\").    Weight as of this encounter: 67.1 kg (148 lb).     Wt Readings from Last 3 Encounters:   03/04/25 67.1 kg (148 lb)   02/19/25 62.3 kg (137 lb 6.4 oz)   02/06/25 67.9 kg (149 lb 9.6 oz)     BP Readings from Last 3 Encounters:   03/04/25 118/82   02/19/25 130/70   02/06/25 126/80       Physical Exam  Vitals and nursing note reviewed.   Constitutional:       Appearance: Normal appearance.   HENT:      Head: Normocephalic.      Right Ear: Tympanic membrane normal.      Left Ear: Tympanic membrane normal.   Eyes:      Extraocular Movements: Extraocular movements intact.      Conjunctiva/sclera: Conjunctivae normal.   Cardiovascular:      Rate and Rhythm: Normal rate and regular rhythm.      Heart sounds: Normal heart sounds. No murmur heard.  Pulmonary:      Effort: Pulmonary effort is normal.      Breath sounds: Normal breath sounds. No wheezing or rales.   Abdominal:      General: Bowel sounds are normal.      Palpations: Abdomen is soft.      Tenderness: There is no abdominal tenderness. There is no guarding.   Musculoskeletal:         General: No swelling. Normal range of motion.   Skin:     General: Skin is warm and dry.   Neurological:      " General: No focal deficit present.      Mental Status: He is alert and oriented to person, place, and time. Mental status is at baseline.      Motor: Weakness present.      Gait: Gait abnormal.   Psychiatric:         Mood and Affect: Mood normal.         Behavior: Behavior normal.         Thought Content: Thought content normal.         Judgment: Judgment normal.          Result Review        No Images in the past 120 days found..     The above data has been reviewed by MARY Rosas 03/04/2025 16:28 EST.          Patient Care Team:  Ksenia Mazariegos APRN as PCP - General (Internal Medicine)    BMI is within normal parameters. No other follow-up for BMI required.       ASSESSMENT & PLAN    Diagnoses and all orders for this visit:    1. Annual physical exam (Primary)  Assessment & Plan:    PSA-ordered in May  Colon Cancer Screening-due  Flu vaccine-declines  COVID-19 vaccine-declines  Lipids-UTD  PNA-declines  TDAP-9/2022  Tobacco use-smokes 1/2 PPD, used to smoke 1-1.5 PPD for 20+ years  Recommend regular dental/eye exams, regular exercise, healthy diet.    Orders:  -     CBC w AUTO Differential; Future  -     Comprehensive metabolic panel; Future  -     Lipid panel; Future  -     TSH+Free T4; Future    2. Chronic bilateral low back pain with bilateral sciatica  -     MRI Lumbar Spine Without Contrast; Future  -     XR Spine Lumbar 4+ View; Future  -     cyclobenzaprine (FLEXERIL) 10 MG tablet; Take 1 tablet by mouth 3 (Three) Times a Day As Needed for Muscle Spasms.  Dispense: 30 tablet; Refill: 0  -     diclofenac (VOLTAREN) 50 MG EC tablet; Take 1 tablet by mouth 2 (Two) Times a Day As Needed (back pain).  Dispense: 60 tablet; Refill: 0    3. Screening for prostate cancer  -     PSA SCREENING; Future    4. Screening for colon cancer  -     Cologuard - Stool, Per Rectum; Future    5. Cigarette nicotine dependence without complication  -      CT Chest Low Dose Cancer Screening WO; Future    6. Need for hepatitis C  screening test  -     Hepatitis C antibody; Future    7. Snoring  -     Home Sleep Study; Future    8. Sleep apnea-like behavior  -     Home Sleep Study; Future    9. Claustrophobia  -     diazePAM (Valium) 5 MG tablet; Take 1 tablet by mouth 1 time for 1 dose. Take 30 minutes to 1 hour prior to MRI.  Dispense: 1 tablet; Refill: 0         Tobacco Use: High Risk (3/4/2025)    Patient History     Smoking Tobacco Use: Every Day     Smokeless Tobacco Use: Never     Passive Exposure: Not on file       Assessment & Plan  1. Anxiety.  He reports experiencing anxiety and has been prescribed Zoloft. He has not noticed a significant difference since switching from Lexapro due to an allergic reaction. He will continue with Zoloft and monitor his symptoms. If anxiety persists, further adjustments to his medication regimen will be considered. May add in hydroxyzine qhs if needed.    2. Allergic reaction to Lexapro.  He experienced a rash after starting Lexapro. He has discontinued Lexapro and switched to Zoloft.    3. Back pain.  He reports chronic back pain exacerbated by a recent fall. An x-ray of the lumbar spine and an MRI have been ordered to assess the extent of nerve involvement, musculature, and arthritis. Diclofenac has been prescribed for pain management. He will take Valium 30 minutes to 1 hour prior to the MRI due to claustrophobia. Muscle relaxers will be refilled to manage severe pain episodes.    4. Sleep apnea.  He reports symptoms suggestive of sleep apnea, including cessation of breathing during sleep. A home sleep study has been ordered to evaluate his sleep patterns and oxygen levels.    5. Health maintenance.  He is due for colon cancer screening and has opted for Cologuard. Lung cancer screening has been scheduled due to his smoking history. Prostate cancer screening is due in April, and blood work orders have been placed for May. Hepatitis C screening will be conducted once in his lifetime. He is up to  date on his tetanus vaccine.      Follow Up     Return in about 6 months (around 9/4/2025) for Recheck.    Please note that portions of this note were completed with a voice recognition program.    Patient was given instructions and counseling regarding his condition or for health maintenance advice. Please see specific information pulled into the AVS if appropriate.   I have reviewed information obtained and documented by others and I have confirmed the accuracy of this documented note.    MARY Rosas    Patient or patient representative verbalized consent for the use of Ambient Listening during the visit with  MARY Rosas for chart documentation. 3/4/2025  16:50 EST

## 2025-03-04 NOTE — ASSESSMENT & PLAN NOTE
PSA-ordered in May  Colon Cancer Screening-due  Flu vaccine-declines  COVID-19 vaccine-declines  Lipids-UTD  PNA-declines  TDAP-9/2022  Tobacco use-smokes 1/2 PPD, used to smoke 1-1.5 PPD for 20+ years  Recommend regular dental/eye exams, regular exercise, healthy diet.

## 2025-04-29 NOTE — TELEPHONE ENCOUNTER
Pt requesting refill for sertraline, has 7 tabs left.     Last refill: 3/18/25  Last ov: 3/04/25  Next ov: n/a    Ok to send in?

## 2025-05-04 DIAGNOSIS — G89.29 CHRONIC BILATERAL LOW BACK PAIN WITH BILATERAL SCIATICA: ICD-10-CM

## 2025-05-04 DIAGNOSIS — M54.41 CHRONIC BILATERAL LOW BACK PAIN WITH BILATERAL SCIATICA: ICD-10-CM

## 2025-05-04 DIAGNOSIS — M54.42 CHRONIC BILATERAL LOW BACK PAIN WITH BILATERAL SCIATICA: ICD-10-CM

## 2025-05-05 RX ORDER — CYCLOBENZAPRINE HCL 10 MG
10 TABLET ORAL 3 TIMES DAILY PRN
Qty: 30 TABLET | Refills: 0 | Status: SHIPPED | OUTPATIENT
Start: 2025-05-05

## 2025-05-06 ENCOUNTER — HOSPITAL ENCOUNTER (OUTPATIENT)
Dept: OTHER | Facility: HOSPITAL | Age: 55
Discharge: HOME | End: 2025-05-06

## 2025-05-06 ENCOUNTER — HOSPITAL ENCOUNTER (EMERGENCY)
Facility: HOSPITAL | Age: 55
Discharge: LEFT BEFORE DISCHARGE | End: 2025-05-06
Attending: EMERGENCY MEDICINE
Payer: COMMERCIAL

## 2025-05-06 VITALS
SYSTOLIC BLOOD PRESSURE: 149 MMHG | OXYGEN SATURATION: 100 % | DIASTOLIC BLOOD PRESSURE: 101 MMHG | HEART RATE: 48 BPM | RESPIRATION RATE: 20 BRPM | TEMPERATURE: 98.4 F

## 2025-05-06 PROCEDURE — 99211 OFF/OP EST MAY X REQ PHY/QHP: CPT | Performed by: EMERGENCY MEDICINE

## 2025-05-09 ENCOUNTER — OFFICE VISIT (OUTPATIENT)
Dept: INTERNAL MEDICINE | Age: 55
End: 2025-05-09
Payer: COMMERCIAL

## 2025-05-09 VITALS
HEART RATE: 86 BPM | TEMPERATURE: 98 F | RESPIRATION RATE: 20 BRPM | DIASTOLIC BLOOD PRESSURE: 80 MMHG | HEIGHT: 72 IN | SYSTOLIC BLOOD PRESSURE: 142 MMHG | BODY MASS INDEX: 19.37 KG/M2 | WEIGHT: 143 LBS

## 2025-05-09 DIAGNOSIS — M54.42 CHRONIC BILATERAL LOW BACK PAIN WITH BILATERAL SCIATICA: Primary | ICD-10-CM

## 2025-05-09 DIAGNOSIS — M51.362 DEGENERATION OF INTERVERTEBRAL DISC OF LUMBAR REGION WITH DISCOGENIC BACK PAIN AND LOWER EXTREMITY PAIN: ICD-10-CM

## 2025-05-09 DIAGNOSIS — G89.29 CHRONIC BILATERAL LOW BACK PAIN WITH BILATERAL SCIATICA: Primary | ICD-10-CM

## 2025-05-09 DIAGNOSIS — M54.41 CHRONIC BILATERAL LOW BACK PAIN WITH BILATERAL SCIATICA: Primary | ICD-10-CM

## 2025-05-09 RX ORDER — LIDOCAINE 50 MG/G
1 PATCH TOPICAL EVERY 24 HOURS
COMMUNITY
Start: 2025-05-06 | End: 2025-05-13

## 2025-05-09 RX ORDER — HYDROCODONE BITARTRATE AND ACETAMINOPHEN 5; 325 MG/1; MG/1
1 TABLET ORAL
COMMUNITY
Start: 2025-05-06 | End: 2025-05-09 | Stop reason: SDUPTHER

## 2025-05-09 RX ORDER — HYDROCODONE BITARTRATE AND ACETAMINOPHEN 5; 325 MG/1; MG/1
1 TABLET ORAL EVERY 6 HOURS PRN
Qty: 12 TABLET | Refills: 0 | Status: SHIPPED | OUTPATIENT
Start: 2025-05-09

## 2025-05-09 RX ORDER — GABAPENTIN 100 MG/1
100 CAPSULE ORAL 3 TIMES DAILY
Qty: 90 CAPSULE | Refills: 0 | Status: SHIPPED | OUTPATIENT
Start: 2025-05-09

## 2025-05-09 NOTE — PROGRESS NOTES
Chief Complaint  Follow-up (BACK PAIN/-BURNING SENSATION IN BACK OF THIGH/-NUMBNESS/)    History of Present Illness  SUBJECTIVE  Carlos Alberto Juan presents to [facility]     History of Present Illness  The patient presents for evaluation of back pain.    He sought an x-ray examination but was deterred by the high cost. Despite this, he continued his prescribed medication regimen and experienced some relief. However, on Tuesday morning, he reported a resurgence of sciatic pain radiating from his back to his buttock, accompanied by numbness in his leg and a burning sensation in his shin. He attempted to seek medical attention at the emergency room but left after a few hours due to discomfort from sitting. He then proceeded to [facility] in [place] where he underwent x-rays and received injections of Toradol and Dilaudid, the latter of which provided some relief. He was also prescribed hydrocodone, steroids, and over-the-counter patches. He reports that the pain medication allows him to sleep, and he is currently taking Robaxin as needed. He has found that standing and keeping weight off the affected leg provides some relief, but sitting or bending at the waist exacerbates the pain. He is currently taking 40 mg of steroids daily and has requested a refill of his hydrocodone prescription.     He has a history of consultation with Dr. Preston approximately 10 to 11 years ago.      History reviewed. No pertinent past medical history.   History reviewed. No pertinent family history.   Past Surgical History:   Procedure Laterality Date    APPENDECTOMY          Current Outpatient Medications:     albuterol sulfate  (90 Base) MCG/ACT inhaler, Inhale 2 puffs Every 4 (Four) Hours As Needed for Wheezing or Shortness of Air., Disp: 6.7 g, Rfl: 0    cyclobenzaprine (FLEXERIL) 10 MG tablet, TAKE 1 TABLET BY MOUTH 3 TIMES A DAY AS NEEDED FOR MUSCLE SPASMS., Disp: 30 tablet, Rfl: 0     "HYDROcodone-acetaminophen (NORCO) 5-325 MG per tablet, Take 1 tablet by mouth Every 6 (Six) Hours As Needed for Moderate Pain., Disp: 12 tablet, Rfl: 0    lidocaine (LIDODERM) 5 %, Place 1 patch on the skin as directed by provider Daily., Disp: , Rfl:     sertraline (Zoloft) 50 MG tablet, Take 1 tablet by mouth Daily., Disp: 30 tablet, Rfl: 2    diclofenac (VOLTAREN) 50 MG EC tablet, Take 1 tablet by mouth 2 (Two) Times a Day As Needed (back pain). (Patient not taking: Reported on 5/9/2025), Disp: 60 tablet, Rfl: 0    gabapentin (NEURONTIN) 100 MG capsule, Take 1 capsule by mouth 3 (Three) Times a Day., Disp: 90 capsule, Rfl: 0    OBJECTIVE  Vital Signs:   /80   Pulse 86   Temp 98 °F (36.7 °C) (Temporal)   Resp 20   Ht 182.9 cm (72\")   Wt 64.9 kg (143 lb)   BMI 19.39 kg/m²    Estimated body mass index is 19.39 kg/m² as calculated from the following:    Height as of this encounter: 182.9 cm (72\").    Weight as of this encounter: 64.9 kg (143 lb).     Wt Readings from Last 3 Encounters:   05/09/25 64.9 kg (143 lb)   03/04/25 67.1 kg (148 lb)   02/19/25 62.3 kg (137 lb 6.4 oz)     BP Readings from Last 3 Encounters:   05/09/25 142/80   05/06/25 (!) 149/101   03/04/25 118/82       Physical Exam  Vitals and nursing note reviewed.   Constitutional:       Appearance: Normal appearance.   HENT:      Head: Normocephalic.   Eyes:      Extraocular Movements: Extraocular movements intact.      Conjunctiva/sclera: Conjunctivae normal.   Cardiovascular:      Rate and Rhythm: Normal rate and regular rhythm.      Heart sounds: Normal heart sounds. No murmur heard.  Pulmonary:      Effort: Pulmonary effort is normal.      Breath sounds: Normal breath sounds. No wheezing or rales.   Abdominal:      General: Bowel sounds are normal.      Palpations: Abdomen is soft.      Tenderness: There is no abdominal tenderness. There is no guarding.   Musculoskeletal:      Cervical back: Normal range of motion and neck supple.      " Lumbar back: Tenderness present. Decreased range of motion. Positive right straight leg raise test.   Skin:     General: Skin is warm and dry.   Neurological:      Mental Status: He is alert. Mental status is at baseline.      Gait: Gait abnormal.   Psychiatric:         Mood and Affect: Mood normal.         Behavior: Behavior normal.         Thought Content: Thought content normal.         Judgment: Judgment normal.          Result Review        XR Spine Lumbar AP & Lateral  Result Date: 5/6/2025  No acute process. Degenerative changes of L5-S1 as above. Images reviewed, interpreted, and dictated by Dr. Andre Bledsoe. Transcribed by Fazal Cui PA-C       The above data has been reviewed by MARY Rosas 05/09/2025 10:29 EDT.          Patient Care Team:  Ksenia Mazariegos APRN as PCP - General (Internal Medicine)    BMI is within normal parameters. No other follow-up for BMI required.       ASSESSMENT & PLAN    Diagnoses and all orders for this visit:    1. Chronic bilateral low back pain with bilateral sciatica (Primary)  -     Ambulatory Referral to Neurosurgery  -     gabapentin (NEURONTIN) 100 MG capsule; Take 1 capsule by mouth 3 (Three) Times a Day.  Dispense: 90 capsule; Refill: 0  -     HYDROcodone-acetaminophen (NORCO) 5-325 MG per tablet; Take 1 tablet by mouth Every 6 (Six) Hours As Needed for Moderate Pain.  Dispense: 12 tablet; Refill: 0    2. Degeneration of intervertebral disc of lumbar region with discogenic back pain and lower extremity pain  -     Ambulatory Referral to Neurosurgery  -     gabapentin (NEURONTIN) 100 MG capsule; Take 1 capsule by mouth 3 (Three) Times a Day.  Dispense: 90 capsule; Refill: 0  -     HYDROcodone-acetaminophen (NORCO) 5-325 MG per tablet; Take 1 tablet by mouth Every 6 (Six) Hours As Needed for Moderate Pain.  Dispense: 12 tablet; Refill: 0         Assessment & Plan  1. Back pain.  - Symptoms suggest a potential pinched nerve, likely due to spinal canal narrowing causing  nerve compression. This is consistent with the severe pain and discomfort experienced.  - X-ray findings indicate degenerative disk disease and narrowing of the spinal canal.  - MRI of the lumbar spine will be scheduled. Referral to neurosurgery has been initiated. Gabapentin will be prescribed for nerve pain management. R/b discussed with the patient.   - Continue with current anti-inflammatory medication, diclofenac. Prescription for hydrocodone will be provided, with instructions to use sparingly and only as needed. Continue with oral steroid regimen. Avoid driving the first time gabapentin is taken to monitor reaction.      Tobacco Use: High Risk (5/9/2025)    Patient History     Smoking Tobacco Use: Every Day     Smokeless Tobacco Use: Never     Passive Exposure: Not on file       Follow Up     No follow-ups on file.    Please note that portions of this note were completed with a voice recognition program.    Patient was given instructions and counseling regarding his condition or for health maintenance advice. Please see specific information pulled into the AVS if appropriate.   I have reviewed information obtained and documented by others and I have confirmed the accuracy of this documented note.    MARY Rosas    Patient or patient representative verbalized consent for the use of Ambient Listening during the visit with  MARY Rosas for chart documentation. 5/9/2025  12:10 EDT

## 2025-05-16 DIAGNOSIS — G89.29 CHRONIC BILATERAL LOW BACK PAIN WITH BILATERAL SCIATICA: ICD-10-CM

## 2025-05-16 DIAGNOSIS — M54.42 CHRONIC BILATERAL LOW BACK PAIN WITH BILATERAL SCIATICA: ICD-10-CM

## 2025-05-16 DIAGNOSIS — M54.41 CHRONIC BILATERAL LOW BACK PAIN WITH BILATERAL SCIATICA: ICD-10-CM

## 2025-05-16 DIAGNOSIS — M51.362 DEGENERATION OF INTERVERTEBRAL DISC OF LUMBAR REGION WITH DISCOGENIC BACK PAIN AND LOWER EXTREMITY PAIN: ICD-10-CM

## 2025-05-16 RX ORDER — HYDROCODONE BITARTRATE AND ACETAMINOPHEN 5; 325 MG/1; MG/1
1 TABLET ORAL EVERY 6 HOURS PRN
Qty: 12 TABLET | Refills: 0 | Status: SHIPPED | OUTPATIENT
Start: 2025-05-16

## 2025-05-16 NOTE — TELEPHONE ENCOUNTER
Caller: Yessica Carlos Alberto Mercadoneth    Relationship: Self    Best call back number: [phone]    Requested Prescriptions:   Requested Prescriptions     Pending Prescriptions Disp Refills    HYDROcodone-acetaminophen (NORCO) 5-325 MG per tablet 12 tablet 0     Sig: Take 1 tablet by mouth Every 6 (Six) Hours As Needed for Moderate Pain.        Pharmacy where request should be sent: [facility] #[id number] - [place], [place] - [address] [facility] [phone] [facility] [phone] FX     Last office visit with prescribing clinician: 5/9/2025   Last telemedicine visit with prescribing clinician: Visit date not found   Next office visit with prescribing clinician: Visit date not found     Does the patient have less than a 3 day supply:  [x] Yes  [] No    Would you like a call back once the refill request has been completed: [] Yes [x] No    If the office needs to give you a call back, can they leave a voicemail: [] Yes [x] No    Kaylynn Maki Rep   05/16/25 12:22 EDT

## 2025-05-17 ENCOUNTER — HOSPITAL ENCOUNTER (OUTPATIENT)
Dept: OTHER | Facility: HOSPITAL | Age: 55
Discharge: HOME | End: 2025-05-17

## 2025-05-22 ENCOUNTER — OFFICE VISIT (OUTPATIENT)
Dept: INTERNAL MEDICINE | Age: 55
End: 2025-05-22
Payer: COMMERCIAL

## 2025-05-22 VITALS
HEART RATE: 69 BPM | DIASTOLIC BLOOD PRESSURE: 70 MMHG | TEMPERATURE: 98.5 F | WEIGHT: 135 LBS | RESPIRATION RATE: 18 BRPM | BODY MASS INDEX: 18.28 KG/M2 | HEIGHT: 72 IN | OXYGEN SATURATION: 99 % | SYSTOLIC BLOOD PRESSURE: 116 MMHG

## 2025-05-22 DIAGNOSIS — R39.11 HESITANCY OF MICTURITION: ICD-10-CM

## 2025-05-22 DIAGNOSIS — Z11.59 NEED FOR HEPATITIS C SCREENING TEST: ICD-10-CM

## 2025-05-22 DIAGNOSIS — M51.362 DEGENERATION OF INTERVERTEBRAL DISC OF LUMBAR REGION WITH DISCOGENIC BACK PAIN AND LOWER EXTREMITY PAIN: ICD-10-CM

## 2025-05-22 DIAGNOSIS — M54.41 CHRONIC BILATERAL LOW BACK PAIN WITH BILATERAL SCIATICA: Primary | ICD-10-CM

## 2025-05-22 DIAGNOSIS — Z12.5 SCREENING FOR PROSTATE CANCER: ICD-10-CM

## 2025-05-22 DIAGNOSIS — Z00.00 ANNUAL PHYSICAL EXAM: ICD-10-CM

## 2025-05-22 DIAGNOSIS — M54.42 CHRONIC BILATERAL LOW BACK PAIN WITH BILATERAL SCIATICA: Primary | ICD-10-CM

## 2025-05-22 DIAGNOSIS — G89.29 CHRONIC BILATERAL LOW BACK PAIN WITH BILATERAL SCIATICA: Primary | ICD-10-CM

## 2025-05-22 LAB
ALBUMIN SERPL-MCNC: 4.5 G/DL (ref 3.5–5.2)
ALBUMIN/GLOB SERPL: 1.7 G/DL
ALP SERPL-CCNC: 79 U/L (ref 39–117)
ALT SERPL W P-5'-P-CCNC: 20 U/L (ref 1–41)
ANION GAP SERPL CALCULATED.3IONS-SCNC: 11.1 MMOL/L (ref 5–15)
AST SERPL-CCNC: 19 U/L (ref 1–40)
BACTERIA UR QL AUTO: NORMAL /HPF
BASOPHILS # BLD AUTO: 0.09 10*3/MM3 (ref 0–0.2)
BASOPHILS NFR BLD AUTO: 1 % (ref 0–1.5)
BILIRUB SERPL-MCNC: 0.3 MG/DL (ref 0–1.2)
BILIRUB UR QL STRIP: NEGATIVE
BUN SERPL-MCNC: 14 MG/DL (ref 6–20)
BUN/CREAT SERPL: 10.9 (ref 7–25)
CALCIUM SPEC-SCNC: 9.8 MG/DL (ref 8.6–10.5)
CHLORIDE SERPL-SCNC: 104 MMOL/L (ref 98–107)
CLARITY UR: ABNORMAL
CO2 SERPL-SCNC: 24.9 MMOL/L (ref 22–29)
COD CRY URNS QL: PRESENT /HPF
COLOR UR: YELLOW
CREAT SERPL-MCNC: 1.28 MG/DL (ref 0.76–1.27)
DEPRECATED RDW RBC AUTO: 41.5 FL (ref 37–54)
EGFRCR SERPLBLD CKD-EPI 2021: 66.5 ML/MIN/1.73
EOSINOPHIL # BLD AUTO: 0.22 10*3/MM3 (ref 0–0.4)
EOSINOPHIL NFR BLD AUTO: 2.5 % (ref 0.3–6.2)
ERYTHROCYTE [DISTWIDTH] IN BLOOD BY AUTOMATED COUNT: 12.6 % (ref 12.3–15.4)
GLOBULIN UR ELPH-MCNC: 2.6 GM/DL
GLUCOSE SERPL-MCNC: 118 MG/DL (ref 65–99)
GLUCOSE UR STRIP-MCNC: NEGATIVE MG/DL
HCT VFR BLD AUTO: 45.2 % (ref 37.5–51)
HCV AB SER QL: NORMAL
HGB BLD-MCNC: 14.8 G/DL (ref 13–17.7)
HGB UR QL STRIP.AUTO: NEGATIVE
HYALINE CASTS UR QL AUTO: NORMAL /LPF
IMM GRANULOCYTES # BLD AUTO: 0.02 10*3/MM3 (ref 0–0.05)
IMM GRANULOCYTES NFR BLD AUTO: 0.2 % (ref 0–0.5)
KETONES UR QL STRIP: NEGATIVE
LEUKOCYTE ESTERASE UR QL STRIP.AUTO: NEGATIVE
LYMPHOCYTES # BLD AUTO: 2.32 10*3/MM3 (ref 0.7–3.1)
LYMPHOCYTES NFR BLD AUTO: 26.9 % (ref 19.6–45.3)
MCH RBC QN AUTO: 29.8 PG (ref 26.6–33)
MCHC RBC AUTO-ENTMCNC: 32.7 G/DL (ref 31.5–35.7)
MCV RBC AUTO: 91.1 FL (ref 79–97)
MONOCYTES # BLD AUTO: 0.59 10*3/MM3 (ref 0.1–0.9)
MONOCYTES NFR BLD AUTO: 6.8 % (ref 5–12)
NEUTROPHILS NFR BLD AUTO: 5.39 10*3/MM3 (ref 1.7–7)
NEUTROPHILS NFR BLD AUTO: 62.6 % (ref 42.7–76)
NITRITE UR QL STRIP: NEGATIVE
NRBC BLD AUTO-RTO: 0 /100 WBC (ref 0–0.2)
PH UR STRIP.AUTO: 5.5 [PH] (ref 5–8)
PLATELET # BLD AUTO: 412 10*3/MM3 (ref 140–450)
PMV BLD AUTO: 10.5 FL (ref 6–12)
POTASSIUM SERPL-SCNC: 4.5 MMOL/L (ref 3.5–5.2)
PROT SERPL-MCNC: 7.1 G/DL (ref 6–8.5)
PROT UR QL STRIP: NEGATIVE
PSA SERPL-MCNC: 2.56 NG/ML (ref 0–4)
RBC # BLD AUTO: 4.96 10*6/MM3 (ref 4.14–5.8)
RBC # UR STRIP: NORMAL /HPF
REF LAB TEST METHOD: NORMAL
SODIUM SERPL-SCNC: 140 MMOL/L (ref 136–145)
SP GR UR STRIP: 1.02 (ref 1–1.03)
SQUAMOUS #/AREA URNS HPF: NORMAL /HPF
T4 FREE SERPL-MCNC: 1.3 NG/DL (ref 0.92–1.68)
TSH SERPL DL<=0.05 MIU/L-ACNC: 1.75 UIU/ML (ref 0.27–4.2)
UROBILINOGEN UR QL STRIP: ABNORMAL
WBC # UR STRIP: NORMAL /HPF
WBC NRBC COR # BLD AUTO: 8.63 10*3/MM3 (ref 3.4–10.8)

## 2025-05-22 PROCEDURE — G0103 PSA SCREENING: HCPCS

## 2025-05-22 PROCEDURE — 85025 COMPLETE CBC W/AUTO DIFF WBC: CPT

## 2025-05-22 PROCEDURE — 84443 ASSAY THYROID STIM HORMONE: CPT

## 2025-05-22 PROCEDURE — 80053 COMPREHEN METABOLIC PANEL: CPT

## 2025-05-22 PROCEDURE — 81001 URINALYSIS AUTO W/SCOPE: CPT

## 2025-05-22 PROCEDURE — 86803 HEPATITIS C AB TEST: CPT

## 2025-05-22 PROCEDURE — 84439 ASSAY OF FREE THYROXINE: CPT

## 2025-05-22 RX ORDER — GABAPENTIN 300 MG/1
300 CAPSULE ORAL 3 TIMES DAILY
Qty: 90 CAPSULE | Refills: 0 | Status: SHIPPED | OUTPATIENT
Start: 2025-05-22

## 2025-05-22 NOTE — PROGRESS NOTES
Chief Complaint  Back Pain (55 year old male here today complaining of back pain and now he is trouble urinating. He states his right leg and foot feel like they are on fire. States he has an appointment with Neurosurgery on June 5. /He has missed work since Tuesday and he needs to get a work note. He was told he may need to get FMLA filed. )    History of Present Illness  SUBJECTIVE  Carlos Alberto Juan presents to [facility]   History of Present Illness  The patient presents for evaluation of pain in the lower extremity and urinary issues.    He reports a progressive worsening of pain in his foot and lower leg, characterized by a burning sensation that intensifies with prolonged standing. He also experiences constant numbness in his foot, akin to the sensation of an asleep limb, which escalates to a burning sensation upon standing. The only position that provides some relief is lying flat on his back, but the pain returns upon standing. He has been prescribed gabapentin, which initially seemed to alleviate the burning sensation in the upper part of his leg, but has since become ineffective. He continues to take 1 gabapentin in the morning, 1 in the afternoon, and 1 at bedtime. He has been advised to seek emergency care, but he is reluctant due to the temporary nature of the relief provided by high-dose pain medication. He has found some relief with Dilaudid, but hydrocodone has been ineffective. He has an upcoming appointment with a neurosurgeon on 06/06/2025. He is currently on short-term disability and is seeking to initiate FMLA due to his inability to work. He took Valium before his MRI, which resulted in the best sleep he has had in the past 3 months.    MRI of the lumbar spine showed spondylosis at L5-S1.  There is a right lateral recess extrusion with superior migration but no definite sequestration.  There is moderate narrowing of the right lateral recess as well as  "likely L5 and S1 nerve root mass effect.      Additionally, he has begun to experience urinary difficulties, specifically a lack of pressure during urination, necessitating him to sit down to urinate. He does not report any incontinence.      History reviewed. No pertinent past medical history.   History reviewed. No pertinent family history.   Past Surgical History:   Procedure Laterality Date    APPENDECTOMY          Current Outpatient Medications:     albuterol sulfate  (90 Base) MCG/ACT inhaler, Inhale 2 puffs Every 4 (Four) Hours As Needed for Wheezing or Shortness of Air., Disp: 6.7 g, Rfl: 0    cyclobenzaprine (FLEXERIL) 10 MG tablet, TAKE 1 TABLET BY MOUTH 3 TIMES A DAY AS NEEDED FOR MUSCLE SPASMS., Disp: 30 tablet, Rfl: 0    gabapentin (NEURONTIN) 300 MG capsule, Take 1 capsule by mouth 3 (Three) Times a Day., Disp: 90 capsule, Rfl: 0    HYDROcodone-acetaminophen (NORCO) 5-325 MG per tablet, Take 1 tablet by mouth Every 6 (Six) Hours As Needed for Moderate Pain., Disp: 12 tablet, Rfl: 0    sertraline (Zoloft) 50 MG tablet, Take 1 tablet by mouth Daily., Disp: 30 tablet, Rfl: 2    diclofenac (VOLTAREN) 50 MG EC tablet, Take 1 tablet by mouth 2 (Two) Times a Day As Needed (back pain). (Patient not taking: Reported on 5/22/2025), Disp: 60 tablet, Rfl: 0    OBJECTIVE  Vital Signs:   /70 (BP Location: Left arm, Patient Position: Sitting)   Pulse 69   Temp 98.5 °F (36.9 °C) (Temporal)   Resp 18   Ht 182.9 cm (72\")   Wt 61.2 kg (135 lb)   SpO2 99%   BMI 18.31 kg/m²    Estimated body mass index is 18.31 kg/m² as calculated from the following:    Height as of this encounter: 182.9 cm (72\").    Weight as of this encounter: 61.2 kg (135 lb).     Wt Readings from Last 3 Encounters:   05/22/25 61.2 kg (135 lb)   05/09/25 64.9 kg (143 lb)   03/04/25 67.1 kg (148 lb)     BP Readings from Last 3 Encounters:   05/22/25 116/70   05/09/25 142/80   05/06/25 (!) 149/101       Physical Exam  Vitals and " nursing note reviewed.   Constitutional:       Appearance: Normal appearance.   HENT:      Head: Normocephalic.   Eyes:      Extraocular Movements: Extraocular movements intact.      Conjunctiva/sclera: Conjunctivae normal.   Musculoskeletal:         General: No swelling.      Cervical back: Normal range of motion and neck supple.      Lumbar back: Spasms present. Decreased range of motion. Positive right straight leg raise test.   Skin:     General: Skin is warm and dry.   Neurological:      General: No focal deficit present.      Mental Status: He is alert. Mental status is at baseline.   Psychiatric:         Mood and Affect: Mood normal.         Thought Content: Thought content normal.          Result Review        XR Spine Lumbar AP & Lateral  Result Date: 5/6/2025  No acute process. Degenerative changes of L5-S1 as above. Images reviewed, interpreted, and dictated by Dr. Andre Bledsoe. Transcribed by Fazal Cui PA-C       The above data has been reviewed by MARY Rosas 05/22/2025 08:25 EDT.          Patient Care Team:  Ksenia Mazariegos APRN as PCP - General (Internal Medicine)            ASSESSMENT & PLAN    Diagnoses and all orders for this visit:    1. Chronic bilateral low back pain with bilateral sciatica (Primary)  -     gabapentin (NEURONTIN) 300 MG capsule; Take 1 capsule by mouth 3 (Three) Times a Day.  Dispense: 90 capsule; Refill: 0    2. Degeneration of intervertebral disc of lumbar region with discogenic back pain and lower extremity pain  -     gabapentin (NEURONTIN) 300 MG capsule; Take 1 capsule by mouth 3 (Three) Times a Day.  Dispense: 90 capsule; Refill: 0    3. Hesitancy of micturition  -     Urinalysis With Microscopic - Urine, Clean Catch; Future         Assessment & Plan  1. Lumbago   - The MRI results indicate spondylosis at L5-S1, a right lateral recess extrusion with superior migration, moderate narrowing of the right lateral recess, and likely L5 and S1 nerve root masses. There is  also disk desiccation and decrease in height with disk bulging at L5-S1.  - Gabapentin initially helped with burning pain in the upper leg but is less effective for pain in the lower leg and toes. The current dosage is 100 mg three times daily.  -MRI of the lumbar spine showed spondylosis at L5-S1.  There is a right lateral recess extrusion with superior migration but no definite sequestration.  There is moderate narrowing of the right lateral recess as well as likely L5 and S1 nerve root mass effect.  - A message has been sent to Dr. Preston's office regarding his symptoms and MRI findings.  It appears that he is a surgical candidate and if pain significantly worsens patient should go to the emergency department.  I advised to monitor for worsening symptoms and potential cauda equina syndrome.  If that happens he is instructed to go to the emergency department.  - The dosage of gabapentin will be increased to 300 mg three times daily. If the pain intensifies in the evening or after prolonged standing, he can take two doses simultaneously, one in the morning and two in the afternoon. A new prescription for gabapentin will be provided.    2. Urinary issues.  - He reports difficulty urinating with no pressure and having to sit down to urinate. This could be related to the MRI findings and potential cauda equina syndrome.  - Physical exam findings and MRI results suggest nerve root involvement that may be contributing to urinary symptoms.  - A message has been sent to Dr. Preston to review the MRI and provide further recommendations. He is advised to monitor for any signs of incontinence or complete inability to urinate, which would require immediate medical attention.  - He is advised to initiate [facility] paperwork to ensure job security during his treatment period.      Tobacco Use: High Risk (5/22/2025)    Patient History     Smoking Tobacco Use: Every Day     Smokeless Tobacco Use: Never     Passive Exposure: Not on  file       Follow Up     No follow-ups on file.    Please note that portions of this note were completed with a voice recognition program.    Patient was given instructions and counseling regarding his condition or for health maintenance advice. Please see specific information pulled into the AVS if appropriate.   I have reviewed information obtained and documented by others and I have confirmed the accuracy of this documented note.    MARY Rosas    Patient or patient representative verbalized consent for the use of Ambient Listening during the visit with  MARY Rosas for chart documentation. 5/22/2025  10:48 EDT

## 2025-06-05 ENCOUNTER — OFFICE VISIT (OUTPATIENT)
Dept: NEUROSURGERY | Facility: CLINIC | Age: 55
End: 2025-06-05
Payer: COMMERCIAL

## 2025-06-05 VITALS
HEART RATE: 52 BPM | HEIGHT: 72 IN | WEIGHT: 136 LBS | SYSTOLIC BLOOD PRESSURE: 135 MMHG | DIASTOLIC BLOOD PRESSURE: 79 MMHG | BODY MASS INDEX: 18.42 KG/M2

## 2025-06-05 DIAGNOSIS — M51.27 HERNIATED NUCLEUS PULPOSUS, L5-S1, RIGHT: Primary | ICD-10-CM

## 2025-06-05 DIAGNOSIS — G89.29 CHRONIC BILATERAL LOW BACK PAIN WITH BILATERAL SCIATICA: ICD-10-CM

## 2025-06-05 DIAGNOSIS — M54.42 CHRONIC BILATERAL LOW BACK PAIN WITH BILATERAL SCIATICA: ICD-10-CM

## 2025-06-05 DIAGNOSIS — M51.362 DEGENERATION OF INTERVERTEBRAL DISC OF LUMBAR REGION WITH DISCOGENIC BACK PAIN AND LOWER EXTREMITY PAIN: ICD-10-CM

## 2025-06-05 DIAGNOSIS — M54.41 CHRONIC BILATERAL LOW BACK PAIN WITH BILATERAL SCIATICA: ICD-10-CM

## 2025-06-05 RX ORDER — HYDROCODONE BITARTRATE AND ACETAMINOPHEN 5; 325 MG/1; MG/1
1 TABLET ORAL EVERY 8 HOURS PRN
Qty: 30 TABLET | Refills: 0 | Status: SHIPPED | OUTPATIENT
Start: 2025-06-05

## 2025-06-05 NOTE — PROGRESS NOTES
"Chief Complaint  Back Pain, Hip Pain (Right ), Leg Pain (Back side of right leg, burning pain  ), Numbness (Right thigh then starts at the knee and travels to the outside of right foot.), Testicle Pain, and Tingling (Right knee to foot )    Subjective          Carlos Alberto Juan who is a 55 y.o. year old male who presents to [facility] for Evaluation of the Spine.     The patient complains of pain located in the Lumbar Spine.  Patients states the pain has been present for 8 months.  The pain came on gradually.  The pain scaled level is 7.  The pain does radiate. Dermatomes are located on right Lumbar at: L5 and S1.  The pain is constant and waxing/waning and described as burning.  The pain is worse at no particular time of day. Patient states Rest makes the pain better.  Patient states activity, work makes the pain worse.    Associated Symptoms Include: Numbness and Tingling right leg and all the toes with burning in the toes. Weakness in the right leg.  Conservative Interventions Include: Pain Medications that were somewhat effective., NSAIDs that were ineffective., Gabapentin that was somewhat effective., and Muscle Relaxants that were not very effective.    Was this the result of an injury or accident? : No    History of Previous Spinal Surgery?: No     reports that he has been smoking cigarettes. He started smoking about 30 years ago. He has a 7.6 pack-year smoking history. He has never used smokeless tobacco.    Review of Systems   Musculoskeletal:  Positive for back pain.        Right leg pain        Objective   Vital Signs:   /79 (Patient Position: Sitting)   Pulse 52   Ht 182.9 cm (72\")   Wt 61.7 kg (136 lb)   BMI 18.44 kg/m²       Physical Exam  Constitutional:       Appearance: Normal appearance.   Pulmonary:      Effort: Pulmonary effort is normal.   Musculoskeletal:         General: Tenderness (right lumbar area) present.      Comments: SLR positive " on the right   Neurological:      General: No focal deficit present.      Mental Status: He is alert and oriented to person, place, and time.      Sensory: No sensory deficit.      Motor: No weakness.      Deep Tendon Reflexes: Reflexes normal.   Psychiatric:         Mood and Affect: Mood normal.         Behavior: Behavior normal.        Neurological Exam  Mental Status  Alert. Oriented to person, place, and time.      Result Review     I have independently interpreted the MRI of lumbar spine without contrast from 5/17/2025 shows degenerative change to the L5-S1 disc with a large disc extrusion paracentral to the right causing significant right lateral recess stenosis which extends superiorly behind the L5 bone.  There is some right foraminal stenosis as well.     Assessment and Plan    Diagnoses and all orders for this visit:    1. Herniated nucleus pulposus, L5-S1, right (Primary)  -     Case Request; Standing  -     Follow Anesthesia Guidelines / Protocol; Future  -     Follow Anesthesia Guidelines / Protocol; Standing  -     SCD (sequential compression device)- to be placed on patient in Pre-op; Standing  -     Verify / Perform Chlorhexidine Skin Prep; Standing  -     ceFAZolin (ANCEF) 2 g in sodium chloride 0.9 % 100 mL IVPB  -     Case Request    2. Chronic bilateral low back pain with bilateral sciatica  -     HYDROcodone-acetaminophen (NORCO) 5-325 MG per tablet; Take 1 tablet by mouth Every 8 (Eight) Hours As Needed for Moderate Pain.  Dispense: 30 tablet; Refill: 0    3. Degeneration of intervertebral disc of lumbar region with discogenic back pain and lower extremity pain  -     HYDROcodone-acetaminophen (NORCO) 5-325 MG per tablet; Take 1 tablet by mouth Every 8 (Eight) Hours As Needed for Moderate Pain.  Dispense: 30 tablet; Refill: 0    He has back pain and right leg pain posteriorly, numbness and tingling to the outside of the right foot.    He has a large right disc extrusion at L5-S1 which likely  affects the S1 nerve root.    I expect that surgery could help the right leg pain.    He did discuss further with Dr. Preston today including the risks, benefits and alternatives. The patient made the decision to pursue surgery in the lumbar spine and will be scheduled for a minimally invasive lumbar discectomy using a right approach at L5-S1.      Follow Up   Return for Postop.  Patient was given instructions and counseling regarding his condition or for health maintenance advice. Please see specific information pulled into the AVS if appropriate.

## 2025-06-06 RX ORDER — CYCLOBENZAPRINE HCL 10 MG
10 TABLET ORAL 3 TIMES DAILY PRN
Qty: 30 TABLET | Refills: 0 | Status: SHIPPED | OUTPATIENT
Start: 2025-06-06

## 2025-06-20 ENCOUNTER — HOSPITAL ENCOUNTER (EMERGENCY)
Facility: HOSPITAL | Age: 55
Discharge: HOME | End: 2025-06-20
Attending: EMERGENCY MEDICINE
Payer: COMMERCIAL

## 2025-06-20 ENCOUNTER — APPOINTMENT (OUTPATIENT)
Dept: CT IMAGING | Facility: HOSPITAL | Age: 55
End: 2025-06-20
Payer: COMMERCIAL

## 2025-06-20 VITALS
DIASTOLIC BLOOD PRESSURE: 83 MMHG | WEIGHT: 154.76 LBS | TEMPERATURE: 99 F | SYSTOLIC BLOOD PRESSURE: 129 MMHG | HEART RATE: 45 BPM | RESPIRATION RATE: 16 BRPM | BODY MASS INDEX: 20.96 KG/M2 | OXYGEN SATURATION: 98 % | HEIGHT: 72 IN
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
## 2025-06-20 LAB
ALBUMIN SERPL-MCNC: 4.6 G/DL (ref 3.5–5.2)
ALBUMIN/GLOB SERPL: 2 G/DL
ALP SERPL-CCNC: 75 U/L (ref 39–117)
ALT SERPL W P-5'-P-CCNC: 29 U/L (ref 1–41)
ANION GAP SERPL CALCULATED.3IONS-SCNC: 11.7 MMOL/L (ref 5–15)
AST SERPL-CCNC: 24 U/L (ref 1–40)
BASOPHILS # BLD AUTO: 0.09 10*3/MM3 (ref 0–0.2)
BASOPHILS NFR BLD AUTO: 0.5 % (ref 0–1.5)
BILIRUB SERPL-MCNC: 0.2 MG/DL (ref 0–1.2)
BUN SERPL-MCNC: 11.7 MG/DL (ref 6–20)
BUN/CREAT SERPL: 13.1 (ref 7–25)
CALCIUM SPEC-SCNC: 9.3 MG/DL (ref 8.6–10.5)
CHLORIDE SERPL-SCNC: 105 MMOL/L (ref 98–107)
CO2 SERPL-SCNC: 23.3 MMOL/L (ref 22–29)
CREAT SERPL-MCNC: 0.89 MG/DL (ref 0.76–1.27)
DEPRECATED RDW RBC AUTO: 44.7 FL (ref 37–54)
EGFRCR SERPLBLD CKD-EPI 2021: 101.8 ML/MIN/1.73
EOSINOPHIL # BLD AUTO: 0.03 10*3/MM3 (ref 0–0.4)
EOSINOPHIL NFR BLD AUTO: 0.2 % (ref 0.3–6.2)
ERYTHROCYTE [DISTWIDTH] IN BLOOD BY AUTOMATED COUNT: 13.2 % (ref 12.3–15.4)
GEN 5 1HR TROPONIN T REFLEX: 12 NG/L
GLOBULIN UR ELPH-MCNC: 2.3 GM/DL
GLUCOSE BLDC GLUCOMTR-MCNC: 104 MG/DL (ref 70–99)
GLUCOSE SERPL-MCNC: 104 MG/DL (ref 65–99)
HCT VFR BLD AUTO: 42.7 % (ref 37.5–51)
HGB BLD-MCNC: 14.3 G/DL (ref 13–17.7)
HOLD SPECIMEN: NORMAL
HOLD SPECIMEN: NORMAL
IMM GRANULOCYTES # BLD AUTO: 0.07 10*3/MM3 (ref 0–0.05)
IMM GRANULOCYTES NFR BLD AUTO: 0.4 % (ref 0–0.5)
LYMPHOCYTES # BLD AUTO: 1.72 10*3/MM3 (ref 0.7–3.1)
LYMPHOCYTES NFR BLD AUTO: 9 % (ref 19.6–45.3)
MAGNESIUM SERPL-MCNC: 2 MG/DL (ref 1.6–2.6)
MCH RBC QN AUTO: 30.6 PG (ref 26.6–33)
MCHC RBC AUTO-ENTMCNC: 33.5 G/DL (ref 31.5–35.7)
MCV RBC AUTO: 91.4 FL (ref 79–97)
MONOCYTES # BLD AUTO: 1.07 10*3/MM3 (ref 0.1–0.9)
MONOCYTES NFR BLD AUTO: 5.6 % (ref 5–12)
NEUTROPHILS NFR BLD AUTO: 16.14 10*3/MM3 (ref 1.7–7)
NEUTROPHILS NFR BLD AUTO: 84.3 % (ref 42.7–76)
NRBC BLD AUTO-RTO: 0 /100 WBC (ref 0–0.2)
PLATELET # BLD AUTO: 334 10*3/MM3 (ref 140–450)
PMV BLD AUTO: 10.8 FL (ref 6–12)
POTASSIUM SERPL-SCNC: 4 MMOL/L (ref 3.5–5.2)
PROT SERPL-MCNC: 6.9 G/DL (ref 6–8.5)
QT INTERVAL: 410 MS
QTC INTERVAL: 381 MS
RBC # BLD AUTO: 4.67 10*6/MM3 (ref 4.14–5.8)
SODIUM SERPL-SCNC: 140 MMOL/L (ref 136–145)
TROPONIN T NUMERIC DELTA: 2 NG/L
TROPONIN T SERPL HS-MCNC: 10 NG/L
WBC NRBC COR # BLD AUTO: 19.12 10*3/MM3 (ref 3.4–10.8)
WHOLE BLOOD HOLD COAG: NORMAL
WHOLE BLOOD HOLD SPECIMEN: NORMAL

## 2025-06-20 PROCEDURE — 82948 REAGENT STRIP/BLOOD GLUCOSE: CPT | Performed by: EMERGENCY MEDICINE

## 2025-06-20 PROCEDURE — 85025 COMPLETE CBC W/AUTO DIFF WBC: CPT | Performed by: EMERGENCY MEDICINE

## 2025-06-20 PROCEDURE — 25010000002 LIDOCAINE 1% - EPINEPHRINE 1:100000 1 %-1:100000 SOLUTION

## 2025-06-20 PROCEDURE — 70450 CT HEAD/BRAIN W/O DYE: CPT

## 2025-06-20 PROCEDURE — 99284 EMERGENCY DEPT VISIT MOD MDM: CPT

## 2025-06-20 PROCEDURE — 80053 COMPREHEN METABOLIC PANEL: CPT | Performed by: EMERGENCY MEDICINE

## 2025-06-20 PROCEDURE — 36415 COLL VENOUS BLD VENIPUNCTURE: CPT

## 2025-06-20 PROCEDURE — 83735 ASSAY OF MAGNESIUM: CPT | Performed by: EMERGENCY MEDICINE

## 2025-06-20 PROCEDURE — 84484 ASSAY OF TROPONIN QUANT: CPT | Performed by: EMERGENCY MEDICINE

## 2025-06-20 PROCEDURE — 93005 ELECTROCARDIOGRAM TRACING: CPT | Performed by: EMERGENCY MEDICINE

## 2025-06-20 PROCEDURE — 93010 ELECTROCARDIOGRAM REPORT: CPT | Performed by: INTERNAL MEDICINE

## 2025-06-20 PROCEDURE — 93005 ELECTROCARDIOGRAM TRACING: CPT

## 2025-06-20 RX ORDER — LIDOCAINE HYDROCHLORIDE AND EPINEPHRINE 10; 10 MG/ML; UG/ML
10 INJECTION, SOLUTION INFILTRATION; PERINEURAL ONCE
Status: COMPLETED | OUTPATIENT
Start: 2025-06-20 | End: 2025-06-20

## 2025-06-20 RX ORDER — SODIUM CHLORIDE 0.9 % (FLUSH) 0.9 %
10 SYRINGE (ML) INJECTION AS NEEDED
Status: DISCONTINUED | OUTPATIENT
Start: 2025-06-20 | End: 2025-06-21 | Stop reason: HOSPADM

## 2025-06-20 RX ADMIN — LIDOCAINE HYDROCHLORIDE,EPINEPHRINE BITARTRATE 10 ML: 10; .01 INJECTION, SOLUTION INFILTRATION; PERINEURAL at 20:53

## 2025-06-21 NOTE — ED PROVIDER NOTES
Time: 10:09 PM EDT  Date of encounter:  6/20/2025  Independent Historian/Clinical History and Information was obtained by:   Patient    History is limited by: N/A    Chief Complaint: Syncope      History of Present Illness:  Patient is a 55 y.o. year old male who presents to the emergency department for evaluation of syncopal episode when the patient bent down to pick something up for his mother and then passed out.  Patient denies chest pain and shortness of breath.  Patient has no cough hemoptysis.  Patient denies nausea, vomiting, and diarrhea.  Patient has no leg pain or swelling.  Patient has no subjective neurological deficit including numbness, tingling, or motor weakness.      Patient Care Team  Primary Care Provider: Ksenia Mazariegos APRN    Past Medical History:     Allergies   Allergen Reactions    Codeine Hives and Nausea And Vomiting    Lexapro [Escitalopram] Rash     Past Medical History:   Diagnosis Date    Bronchitis     Chronic bilateral low back pain     Lumbar radiculopathy     Right wrist injury      Past Surgical History:   Procedure Laterality Date    APPENDECTOMY       History reviewed. No pertinent family history.    Home Medications:  Prior to Admission medications    Medication Sig Start Date End Date Taking? Authorizing Provider   albuterol sulfate  (90 Base) MCG/ACT inhaler Inhale 2 puffs Every 4 (Four) Hours As Needed for Wheezing or Shortness of Air. 2/6/25   Kyra Milligan APRN   cyclobenzaprine (FLEXERIL) 10 MG tablet Take 1 tablet by mouth 3 (Three) Times a Day As Needed for Muscle Spasms. 6/6/25   Seth Preston MD   diclofenac (VOLTAREN) 50 MG EC tablet Take 1 tablet by mouth 2 (Two) Times a Day As Needed (back pain).  Patient not taking: Reported on 5/9/2025 3/4/25   Ksenia Mazariegos APRN   gabapentin (NEURONTIN) 300 MG capsule Take 1 capsule by mouth 3 (Three) Times a Day. 5/22/25   Ksenia Mazariegos APRN   HYDROcodone-acetaminophen (NORCO) 5-325 MG per tablet Take 1 tablet  "by mouth Every 8 (Eight) Hours As Needed for Moderate Pain. 6/5/25   Seth Preston MD   levocetirizine (XYZAL) 5 MG tablet Take 1 tablet by mouth Every Evening.    Provider, MD Amina   sertraline (Zoloft) 50 MG tablet Take 1 tablet by mouth Daily. 4/29/25   Ksenia Mazariegos APRN        Social History:   Social History     Tobacco Use    Smoking status: Every Day     Current packs/day: 0.25     Average packs/day: 0.3 packs/day for 30.5 years (7.6 ttl pk-yrs)     Types: Cigarettes     Start date: 1995     Passive exposure: Current    Smokeless tobacco: Never   Vaping Use    Vaping status: Some Days    Substances: Nicotine (sometimes)   Substance Use Topics    Alcohol use: Never    Drug use: Never         Review of Systems:  Review of Systems   Constitutional:  Negative for chills and fever.   HENT:  Negative for congestion, rhinorrhea and sore throat.    Eyes:  Negative for pain and visual disturbance.   Respiratory:  Negative for apnea, cough, chest tightness and shortness of breath.    Cardiovascular:  Negative for chest pain and palpitations.   Gastrointestinal:  Negative for abdominal pain, diarrhea, nausea and vomiting.   Genitourinary:  Negative for difficulty urinating and dysuria.   Musculoskeletal:  Negative for joint swelling and myalgias.   Skin:  Negative for color change.   Neurological:  Negative for seizures and headaches.   Psychiatric/Behavioral: Negative.     All other systems reviewed and are negative.       Physical Exam:  /83 (BP Location: Right arm, Patient Position: Sitting)   Pulse (!) 45   Temp 99 °F (37.2 °C) (Oral)   Resp 16   Ht 182.9 cm (72\")   Wt 70.2 kg (154 lb 12.2 oz)   SpO2 98%   BMI 20.99 kg/m²     Physical Exam  Vitals and nursing note reviewed.   Constitutional:       General: He is not in acute distress.     Appearance: Normal appearance. He is not toxic-appearing.   HENT:      Head: Normocephalic and atraumatic.      Jaw: There is normal jaw occlusion.   Eyes:    "   General: Lids are normal.      Extraocular Movements: Extraocular movements intact.      Conjunctiva/sclera: Conjunctivae normal.      Pupils: Pupils are equal, round, and reactive to light.   Cardiovascular:      Rate and Rhythm: Normal rate and regular rhythm.      Pulses: Normal pulses.      Heart sounds: Normal heart sounds.   Pulmonary:      Effort: Pulmonary effort is normal. No respiratory distress.      Breath sounds: Normal breath sounds. No wheezing or rhonchi.   Abdominal:      General: Abdomen is flat.      Palpations: Abdomen is soft.      Tenderness: There is no abdominal tenderness. There is no guarding or rebound.   Musculoskeletal:         General: Normal range of motion.      Cervical back: Normal range of motion and neck supple.      Right lower leg: No edema.      Left lower leg: No edema.   Skin:     General: Skin is warm and dry.      Comments: (+) 2 cm laceration to right eyebrow    (+) 3 cm laceration to chin   Neurological:      Mental Status: He is alert and oriented to person, place, and time. Mental status is at baseline.   Psychiatric:         Mood and Affect: Mood normal.                    Medical Decision Making:      Comorbidities that affect care:    None    External Notes reviewed:    Previous ED Note: Patient was seen in another emergency department in May for anxiety and depression      The following orders were placed and all results were independently analyzed by me:  Orders Placed This Encounter   Procedures    CT Head Without Contrast    Leeds Draw    Comprehensive Metabolic Panel    Magnesium    High Sensitivity Troponin T    CBC Auto Differential    High Sensitivity Troponin T 1Hr    NPO Diet NPO Type: Strict NPO    Undress & Gown    Continuous Pulse Oximetry    Vital Signs    Orthostatic Blood Pressure    Oxygen Therapy- Nasal Cannula; Titrate 1-6 LPM Per SpO2; 90 - 95%    POC Glucose Once    ECG 12 Lead Syncope    Insert Peripheral IV    CBC & Differential    Green  Top (Gel)    Lavender Top    Gold Top - SST    Light Blue Top       Medications Given in the Emergency Department:  Medications   sodium chloride 0.9 % flush 10 mL (has no administration in time range)   lidocaine 1% - EPINEPHrine 1:564556 (XYLOCAINE W/EPI) 1 %-1:283346 injection 10 mL (10 mL Injection Given by Other 6/20/25 2053)        ED Course:         Labs:    Lab Results (last 24 hours)       Procedure Component Value Units Date/Time    CBC & Differential [[id number]]  (Abnormal) Collected: 06/20/25 2016    Specimen: Blood Updated: 06/20/25 2026    Narrative:      The following orders were created for panel order CBC & Differential.  Procedure                               Abnormality         Status                     ---------                               -----------         ------                     CBC Auto Differential[[id number]]        Abnormal            Final result                 Please view results for these tests on the individual orders.    Comprehensive Metabolic Panel [[id number]]  (Abnormal) Collected: 06/20/25 2016    Specimen: Blood Updated: 06/20/25 2046     Glucose 104 mg/dL      BUN 11.7 mg/dL      Creatinine 0.89 mg/dL      Sodium 140 mmol/L      Potassium 4.0 mmol/L      Chloride 105 mmol/L      CO2 23.3 mmol/L      Calcium 9.3 mg/dL      Total Protein 6.9 g/dL      Albumin 4.6 g/dL      ALT (SGPT) 29 U/L      AST (SGOT) 24 U/L      Alkaline Phosphatase 75 U/L      Total Bilirubin 0.2 mg/dL      Globulin 2.3 gm/dL      A/G Ratio 2.0 g/dL      BUN/Creatinine Ratio 13.1     Anion Gap 11.7 mmol/L      eGFR 101.8 mL/min/1.73     Narrative:      GFR Categories in Chronic Kidney Disease (CKD)              GFR Category          GFR (mL/min/1.73)    Interpretation  G1                    90 or greater        Normal or high (1)  G2                    60-89                Mild decrease (1)  G3a                   45-59                Mild to moderate decrease  G3b                   30-44                 Moderate to severe decrease  G4                    15-29                Severe decrease  G5                    14 or less           Kidney failure    (1)In the absence of evidence of kidney disease, neither GFR category G1 or G2 fulfill the criteria for CKD.    eGFR calculation 2021 CKD-EPI creatinine equation, which does not include race as a factor    Magnesium [[id number]]  (Normal) Collected: 06/20/25 2016    Specimen: Blood Updated: 06/20/25 2046     Magnesium 2.0 mg/dL     High Sensitivity Troponin T [[id number]]  (Normal) Collected: 06/20/25 2016    Specimen: Blood Updated: 06/20/25 2046     HS Troponin T 10 ng/L     Narrative:      High Sensitive Troponin T Reference Range:  <14.0 ng/L- Negative Female for AMI  <22.0 ng/L- Negative Male for AMI  >=14 - Abnormal Female indicating possible myocardial injury.  >=22 - Abnormal Male indicating possible myocardial injury.   Clinicians would have to utilize clinical acumen, EKG, Troponin, and serial changes to determine if it is an Acute Myocardial Infarction or myocardial injury due to an underlying chronic condition.         CBC Auto Differential [[id number]]  (Abnormal) Collected: 06/20/25 2016    Specimen: Blood Updated: 06/20/25 2026     WBC 19.12 10*3/mm3      RBC 4.67 10*6/mm3      Hemoglobin 14.3 g/dL      Hematocrit 42.7 %      MCV 91.4 fL      MCH 30.6 pg      MCHC 33.5 g/dL      RDW 13.2 %      RDW-SD 44.7 fl      MPV 10.8 fL      Platelets 334 10*3/mm3      Neutrophil % 84.3 %      Lymphocyte % 9.0 %      Monocyte % 5.6 %      Eosinophil % 0.2 %      Basophil % 0.5 %      Immature Grans % 0.4 %      Neutrophils, Absolute 16.14 10*3/mm3      Lymphocytes, Absolute 1.72 10*3/mm3      Monocytes, Absolute 1.07 10*3/mm3      Eosinophils, Absolute 0.03 10*3/mm3      Basophils, Absolute 0.09 10*3/mm3      Immature Grans, Absolute 0.07 10*3/mm3      nRBC 0.0 /100 WBC     POC Glucose Once [[id number]]  (Abnormal) Collected: 06/20/25 2018    Specimen: Blood  Updated: 06/20/25 2023     Glucose 104 mg/dL      Comment: Serial Number: [id number]:  [id number]       High Sensitivity Troponin T 1Hr [[id number]]  (Normal) Collected: 06/20/25 2124    Specimen: Blood Updated: 06/20/25 2148     HS Troponin T 12 ng/L      Troponin T Numeric Delta 2 ng/L     Narrative:      High Sensitive Troponin T Reference Range:  <14.0 ng/L- Negative Female for AMI  <22.0 ng/L- Negative Male for AMI  >=14 - Abnormal Female indicating possible myocardial injury.  >=22 - Abnormal Male indicating possible myocardial injury.   Clinicians would have to utilize clinical acumen, EKG, Troponin, and serial changes to determine if it is an Acute Myocardial Infarction or myocardial injury due to an underlying chronic condition.                  Imaging:    CT Head Without Contrast  Result Date: 6/20/2025  CT HEAD WO CONTRAST Date of Exam: 6/20/2025 8:53 PM EDT Indication: Headache headache. Comparison: None available. Technique: Axial CT images were obtained of the head without contrast administration.  Reconstructed coronal and sagittal images were also obtained. Automated exposure control and iterative construction methods were used. Findings: No acute intracranial hemorrhage.Intact appearing gray-white differentiation.No extra-axial fluid collection.No significant mass effect. No hydrocephalus. Brain volume appears age-appropriate. Minimal scattered areas of periventricular and subcortical white matter hypoattenuation, nonspecific, perhaps from small vessel ischemic/hypertensive changes in a patient of this age.There are intracranial atherosclerotic calcifications. Mild scattered mucosal thickening in the paranasal sinuses.. Right maxillary sinus mucous retention cyst. Mastoid air cells are essentially clear.Included globes and orbits appear unremarkable by CT. No acute or aggressive appearing bony or extracranial soft tissue process.     Impression: No acute intracranial findings. Electronically  Signed: Noah Mcallister MD  6/20/2025 9:12 PM EDT  Workstation ID: [id number]        Differential Diagnosis and Discussion:    Syncope: Differential diagnosis includes but is not limited to TIA, hyperventilation, aortic stenosis, pulmonary emboli, myocardial disease, bradycardia arrhythmia, heart block, tachyarrhythmia, vasovagal, orthostatic hypotension, ruptured AAA, aortic dissection, subarachnoid hemorrhage, seizure, hypoglycemia.    PROCEDURES:    Labs were collected in the emergency department and all labs were reviewed and interpreted by me.  An EKG was performed and the EKG was interpreted by me.  CT scan was performed in the emergency department and the CT scan radiology impression was interpreted by me.    ECG 12 Lead Syncope   Preliminary Result   HEART RATE=52  bpm   RR Koiqancb=5522  ms   PA Pxxevcxp=158  ms   P Horizontal Axis=11  deg   P Front Axis=72  deg   QRSD Igivtfpm=679  ms   QT Hzmjqzlo=721  ms   HFrX=584  ms   QRS Axis=58  deg   T Wave Axis=59  deg   - NORMAL ECG -   Sinus rhythm   ST elev, probable normal early repol pattern   Date and Time of Study:2025-06-20 20:10:52          Procedures    MDM         The patient presented with a laceration in need of repair. See laceration repair note for details. The wound was irrigated with copious normal saline irrigation.  3 sutures were used to approximate the wound edges of the right eyebrow laceration and 3 sutures were placed to the chin. Tetanus was not given. The patient tolerated the procedure well. Acute bleeding has ceased and the wound was approximated in the emergency department. Patient was counseled to keep the wound clean, dry, and out of the sun. Patient was counseled to change dressings daily. Patient was advised to return to the ED for worsening erythema, pain, swelling, fever, excessive drainage or signs of infection. They were counseled to follow up for suture removal as described in the discharge instructions. Patient verbalizes  understanding and agrees to follow up as instructed.      The patient presented with a history of the syncopal episode.  Patient was given a 1 L normal saline bolus as the patient did not eat or drink much today and was working.  The patient is now resting comfortably and feels better, is alert, and is in no distress. The repeat examination is unremarkable and benign. The patient is neurologically intact, has a normal mental status, and is ambulatory in the ED. The electrocardiogram shows no signs of acute ischemia and the history, exam, diagnostic testing and current condition do not suggest that the patient is having an acute myocardial infarction, significant arrhythmia, unstable angina, a stroke or TIA, a significant vascular event, gastrointestinal bleeding, sepsis, or other significant pathology that would warrant further testing, continued ED treatment, admission, or other specialist consultation at this point. The vital signs have been stable. The patient's condition is stable and appropriate for discharge. The patient will pursue further outpatient evaluation with the primary care physician, or designated physician. The patient was counseled to return to the ED for continued syncope, chest pain, palpitations, blood in their stool, or weakness or light-headedness that does not improve. They were advised to return to the ED for any symptoms with which they may feel concerned. The patient has expressed a clear and thorough understanding and agreed to follow-up as instructed.              Patient Care Considerations:    I considered a CT cervical spine, however the patient denies neck pain.      Consultants/Shared Management Plan:    None    Social Determinants of Health:    Patient is independent, reliable, and has access to care.       Disposition and Care Coordination:    Discharged: I considered escalation of care by admitting this patient to the hospital, however patient reports improvement with ED  treatment and is stable suitable for discharge.    I have explained the patient´s condition, diagnoses and treatment plan based on the information available to me at this time. I have answered questions and addressed any concerns. The patient has a good  understanding of the patient´s diagnosis, condition, and treatment plan as can be expected at this point. The vital signs have been stable. The patient´s condition is stable and appropriate for discharge from the emergency department.      The patient will pursue further outpatient evaluation with the primary care physician or other designated or consulting physician as outlined in the discharge instructions. They are agreeable to this plan of care and follow-up instructions have been explained in detail. The patient has received these instructions in written format and has expressed an understanding of the discharge instructions. The patient is aware that any significant change in condition or worsening of symptoms should prompt an immediate return to this or the closest emergency department or call to 911.  I have explained discharge medications and the need for follow up with the patient/caretakers. This was also printed in the discharge instructions. Patient was discharged with the following medications and follow up:      Medication List      No changes were made to your prescriptions during this visit.      Ksenia Mazariegos, APRN  [address] [facility]  [address] [place] [id number]  [phone]    In 2 days         Final diagnoses:   Fall, initial encounter        ED Disposition       ED Disposition   Discharge    Condition   Stable    Comment   --               This medical record created using voice recognition software.             Brendon Cantrell MD  06/20/25 9207

## 2025-06-26 LAB
QT INTERVAL: 410 MS
QTC INTERVAL: 381 MS

## 2025-07-10 ENCOUNTER — ANESTHESIA EVENT (OUTPATIENT)
Dept: PERIOP | Facility: HOSPITAL | Age: 55
End: 2025-07-10
Payer: COMMERCIAL

## 2025-07-11 ENCOUNTER — HOSPITAL ENCOUNTER (OUTPATIENT)
Facility: HOSPITAL | Age: 55
Setting detail: HOSPITAL OUTPATIENT SURGERY
Discharge: HOME | End: 2025-07-11
Attending: NEUROLOGICAL SURGERY | Admitting: NEUROLOGICAL SURGERY
Payer: COMMERCIAL

## 2025-07-11 ENCOUNTER — ANESTHESIA (OUTPATIENT)
Dept: PERIOP | Facility: HOSPITAL | Age: 55
End: 2025-07-11
Payer: COMMERCIAL

## 2025-07-11 ENCOUNTER — APPOINTMENT (OUTPATIENT)
Dept: GENERAL RADIOLOGY | Facility: HOSPITAL | Age: 55
End: 2025-07-11
Payer: COMMERCIAL

## 2025-07-11 VITALS
RESPIRATION RATE: 10 BRPM | OXYGEN SATURATION: 99 % | HEART RATE: 62 BPM | DIASTOLIC BLOOD PRESSURE: 83 MMHG | SYSTOLIC BLOOD PRESSURE: 127 MMHG | TEMPERATURE: 98.3 F

## 2025-07-11 DIAGNOSIS — M54.41 CHRONIC BILATERAL LOW BACK PAIN WITH BILATERAL SCIATICA: ICD-10-CM

## 2025-07-11 DIAGNOSIS — G89.29 CHRONIC BILATERAL LOW BACK PAIN WITH BILATERAL SCIATICA: ICD-10-CM

## 2025-07-11 DIAGNOSIS — M51.362 DEGENERATION OF INTERVERTEBRAL DISC OF LUMBAR REGION WITH DISCOGENIC BACK PAIN AND LOWER EXTREMITY PAIN: ICD-10-CM

## 2025-07-11 DIAGNOSIS — M51.27 HERNIATED NUCLEUS PULPOSUS, L5-S1, RIGHT: ICD-10-CM

## 2025-07-11 DIAGNOSIS — M54.42 CHRONIC BILATERAL LOW BACK PAIN WITH BILATERAL SCIATICA: ICD-10-CM

## 2025-07-11 PROCEDURE — 63030 LAMOT DCMPRN NRV RT 1 LMBR: CPT | Performed by: NEUROLOGICAL SURGERY

## 2025-07-11 PROCEDURE — 25810000003 LACTATED RINGERS PER 1000 ML: Performed by: ANESTHESIOLOGY

## 2025-07-11 PROCEDURE — 25010000002 DEXAMETHASONE PER 1 MG

## 2025-07-11 PROCEDURE — 25010000002 FENTANYL CITRATE (PF) 50 MCG/ML SOLUTION

## 2025-07-11 PROCEDURE — 25010000002 ONDANSETRON PER 1 MG: Performed by: ANESTHESIOLOGY

## 2025-07-11 PROCEDURE — 25010000002 HYDROMORPHONE 1 MG/ML SOLUTION: Performed by: NURSE ANESTHETIST, CERTIFIED REGISTERED

## 2025-07-11 PROCEDURE — 25010000002 LIDOCAINE PF 2% 2 % SOLUTION

## 2025-07-11 PROCEDURE — 25010000002 PROPOFOL 200 MG/20ML EMULSION: Performed by: ANESTHESIOLOGY

## 2025-07-11 PROCEDURE — 63030 LAMOT DCMPRN NRV RT 1 LMBR: CPT | Performed by: SPECIALIST/TECHNOLOGIST, OTHER

## 2025-07-11 PROCEDURE — 25010000002 METHYLPREDNISOLONE PER 40 MG: Performed by: NEUROLOGICAL SURGERY

## 2025-07-11 PROCEDURE — 25010000002 CEFAZOLIN PER 500 MG: Performed by: NEUROLOGICAL SURGERY

## 2025-07-11 PROCEDURE — S0260 H&P FOR SURGERY: HCPCS | Performed by: NEUROLOGICAL SURGERY

## 2025-07-11 PROCEDURE — 76000 FLUOROSCOPY <1 HR PHYS/QHP: CPT

## 2025-07-11 PROCEDURE — 25010000002 SUGAMMADEX 200 MG/2ML SOLUTION: Performed by: ANESTHESIOLOGY

## 2025-07-11 PROCEDURE — 25010000002 LIDOCAINE 1% - EPINEPHRINE 1:100000 1 %-1:100000 SOLUTION: Performed by: NEUROLOGICAL SURGERY

## 2025-07-11 PROCEDURE — 25010000002 MIDAZOLAM PER 1MG: Performed by: ANESTHESIOLOGY

## 2025-07-11 RX ORDER — MAGNESIUM HYDROXIDE 1200 MG/15ML
LIQUID ORAL AS NEEDED
Status: DISCONTINUED | OUTPATIENT
Start: 2025-07-11 | End: 2025-07-11 | Stop reason: HOSPADM

## 2025-07-11 RX ORDER — PROPOFOL 10 MG/ML
INJECTION, EMULSION INTRAVENOUS AS NEEDED
Status: DISCONTINUED | OUTPATIENT
Start: 2025-07-11 | End: 2025-07-11 | Stop reason: SURG

## 2025-07-11 RX ORDER — DEXAMETHASONE SODIUM PHOSPHATE 4 MG/ML
INJECTION, SOLUTION INTRA-ARTICULAR; INTRALESIONAL; INTRAMUSCULAR; INTRAVENOUS; SOFT TISSUE AS NEEDED
Status: DISCONTINUED | OUTPATIENT
Start: 2025-07-11 | End: 2025-07-11 | Stop reason: SURG

## 2025-07-11 RX ORDER — LIDOCAINE HYDROCHLORIDE 20 MG/ML
INJECTION, SOLUTION EPIDURAL; INFILTRATION; INTRACAUDAL; PERINEURAL AS NEEDED
Status: DISCONTINUED | OUTPATIENT
Start: 2025-07-11 | End: 2025-07-11 | Stop reason: SURG

## 2025-07-11 RX ORDER — ONDANSETRON 2 MG/ML
INJECTION INTRAMUSCULAR; INTRAVENOUS AS NEEDED
Status: DISCONTINUED | OUTPATIENT
Start: 2025-07-11 | End: 2025-07-11 | Stop reason: SURG

## 2025-07-11 RX ORDER — OXYCODONE HYDROCHLORIDE 5 MG/1
5 TABLET ORAL
Status: COMPLETED | OUTPATIENT
Start: 2025-07-11 | End: 2025-07-11

## 2025-07-11 RX ORDER — PROMETHAZINE HYDROCHLORIDE 25 MG/1
25 TABLET ORAL ONCE AS NEEDED
Status: DISCONTINUED | OUTPATIENT
Start: 2025-07-11 | End: 2025-07-11 | Stop reason: HOSPADM

## 2025-07-11 RX ORDER — EPHEDRINE SULFATE 50 MG/ML
INJECTION INTRAVENOUS AS NEEDED
Status: DISCONTINUED | OUTPATIENT
Start: 2025-07-11 | End: 2025-07-11 | Stop reason: SURG

## 2025-07-11 RX ORDER — FENTANYL CITRATE 50 UG/ML
INJECTION, SOLUTION INTRAMUSCULAR; INTRAVENOUS AS NEEDED
Status: DISCONTINUED | OUTPATIENT
Start: 2025-07-11 | End: 2025-07-11 | Stop reason: SURG

## 2025-07-11 RX ORDER — ROCURONIUM BROMIDE 10 MG/ML
INJECTION, SOLUTION INTRAVENOUS AS NEEDED
Status: DISCONTINUED | OUTPATIENT
Start: 2025-07-11 | End: 2025-07-11 | Stop reason: SURG

## 2025-07-11 RX ORDER — PROMETHAZINE HYDROCHLORIDE 25 MG/1
25 SUPPOSITORY RECTAL ONCE AS NEEDED
Status: DISCONTINUED | OUTPATIENT
Start: 2025-07-11 | End: 2025-07-11 | Stop reason: HOSPADM

## 2025-07-11 RX ORDER — METHYLPREDNISOLONE ACETATE 40 MG/ML
INJECTION, SUSPENSION INTRA-ARTICULAR; INTRALESIONAL; INTRAMUSCULAR; SOFT TISSUE AS NEEDED
Status: DISCONTINUED | OUTPATIENT
Start: 2025-07-11 | End: 2025-07-11 | Stop reason: HOSPADM

## 2025-07-11 RX ORDER — ONDANSETRON 2 MG/ML
4 INJECTION INTRAMUSCULAR; INTRAVENOUS ONCE AS NEEDED
Status: DISCONTINUED | OUTPATIENT
Start: 2025-07-11 | End: 2025-07-11 | Stop reason: HOSPADM

## 2025-07-11 RX ORDER — LIDOCAINE HYDROCHLORIDE AND EPINEPHRINE 10; 10 MG/ML; UG/ML
INJECTION, SOLUTION INFILTRATION; PERINEURAL AS NEEDED
Status: DISCONTINUED | OUTPATIENT
Start: 2025-07-11 | End: 2025-07-11 | Stop reason: HOSPADM

## 2025-07-11 RX ORDER — ACETAMINOPHEN 500 MG
1000 TABLET ORAL ONCE
Status: COMPLETED | OUTPATIENT
Start: 2025-07-11 | End: 2025-07-11

## 2025-07-11 RX ORDER — SODIUM CHLORIDE, SODIUM LACTATE, POTASSIUM CHLORIDE, CALCIUM CHLORIDE 600; 310; 30; 20 MG/100ML; MG/100ML; MG/100ML; MG/100ML
9 INJECTION, SOLUTION INTRAVENOUS CONTINUOUS PRN
Status: DISCONTINUED | OUTPATIENT
Start: 2025-07-11 | End: 2025-07-11 | Stop reason: HOSPADM

## 2025-07-11 RX ORDER — MIDAZOLAM HYDROCHLORIDE 2 MG/2ML
2 INJECTION, SOLUTION INTRAMUSCULAR; INTRAVENOUS ONCE
Status: COMPLETED | OUTPATIENT
Start: 2025-07-11 | End: 2025-07-11

## 2025-07-11 RX ORDER — HYDROCODONE BITARTRATE AND ACETAMINOPHEN 5; 325 MG/1; MG/1
1 TABLET ORAL EVERY 6 HOURS PRN
Qty: 20 TABLET | Refills: 0 | Status: SHIPPED | OUTPATIENT
Start: 2025-07-11

## 2025-07-11 RX ADMIN — PROPOFOL 150 MG: 10 INJECTION, EMULSION INTRAVENOUS at 08:37

## 2025-07-11 RX ADMIN — OXYCODONE HYDROCHLORIDE 5 MG: 5 TABLET ORAL at 10:24

## 2025-07-11 RX ADMIN — ONDANSETRON 4 MG: 2 INJECTION INTRAMUSCULAR; INTRAVENOUS at 09:33

## 2025-07-11 RX ADMIN — EPHEDRINE SULFATE 10 MG: 50 INJECTION INTRAVENOUS at 08:11

## 2025-07-11 RX ADMIN — FENTANYL CITRATE 50 MCG: 50 INJECTION, SOLUTION INTRAMUSCULAR; INTRAVENOUS at 09:44

## 2025-07-11 RX ADMIN — FENTANYL CITRATE 50 MCG: 50 INJECTION, SOLUTION INTRAMUSCULAR; INTRAVENOUS at 08:36

## 2025-07-11 RX ADMIN — OXYCODONE HYDROCHLORIDE 5 MG: 5 TABLET ORAL at 10:07

## 2025-07-11 RX ADMIN — SODIUM CHLORIDE, POTASSIUM CHLORIDE, SODIUM LACTATE AND CALCIUM CHLORIDE 9 ML/HR: 600; 310; 30; 20 INJECTION, SOLUTION INTRAVENOUS at 07:49

## 2025-07-11 RX ADMIN — SODIUM CHLORIDE 2 G: 9 INJECTION, SOLUTION INTRAVENOUS at 08:44

## 2025-07-11 RX ADMIN — DEXAMETHASONE SODIUM PHOSPHATE 4 MG: 4 INJECTION, SOLUTION INTRAMUSCULAR; INTRAVENOUS at 08:44

## 2025-07-11 RX ADMIN — HYDROMORPHONE HYDROCHLORIDE 0.5 MG: 1 INJECTION, SOLUTION INTRAMUSCULAR; INTRAVENOUS; SUBCUTANEOUS at 10:15

## 2025-07-11 RX ADMIN — LIDOCAINE HYDROCHLORIDE 100 MG: 20 INJECTION, SOLUTION EPIDURAL; INFILTRATION; INTRACAUDAL; PERINEURAL at 08:37

## 2025-07-11 RX ADMIN — SUGAMMADEX 200 MG: 100 INJECTION, SOLUTION INTRAVENOUS at 09:40

## 2025-07-11 RX ADMIN — ROCURONIUM BROMIDE 50 MG: 10 INJECTION, SOLUTION INTRAVENOUS at 08:37

## 2025-07-11 RX ADMIN — EPHEDRINE SULFATE 10 MG: 50 INJECTION INTRAVENOUS at 08:52

## 2025-07-11 RX ADMIN — MIDAZOLAM HYDROCHLORIDE 2 MG: 1 INJECTION, SOLUTION INTRAMUSCULAR; INTRAVENOUS at 08:25

## 2025-07-11 RX ADMIN — ACETAMINOPHEN 1000 MG: 500 TABLET ORAL at 07:49

## 2025-07-11 RX ADMIN — ROCURONIUM BROMIDE 20 MG: 10 INJECTION, SOLUTION INTRAVENOUS at 09:27

## 2025-07-11 RX ADMIN — HYDROMORPHONE HYDROCHLORIDE 0.5 MG: 1 INJECTION, SOLUTION INTRAMUSCULAR; INTRAVENOUS; SUBCUTANEOUS at 10:07

## 2025-07-11 NOTE — H&P
[facility]   HISTORY AND PHYSICAL    Patient Name: Carlos Alberto Juan  : 1970  MRN: [id number]  Primary Care Physician:  Ksenia Mazariegos APRN  Date of admission: 2025    Subjective   Subjective     Chief Complaint: Right leg pain and numbness    History of Present Illness  55-year-old male with right leg pain and numbness into the foot.  He has a right L5-S1 disc herniation.  He has failed conservative interventions and opted for decompression and attempt to reduce the leg pain and numbness.      Review of Systems   Musculoskeletal:  Positive for back pain.   Neurological:  Positive for numbness.        Personal History     Past Medical History:   Diagnosis Date    Anxiety     Bronchitis     Chronic bilateral low back pain     Lumbar radiculopathy     Right wrist injury     Seasonal allergies        Past Surgical History:   Procedure Laterality Date    APPENDECTOMY         Family History: family history is not on file. Otherwise pertinent FHx was reviewed and not pertinent to current issue.    Social History:  reports that he has been smoking cigarettes. He started smoking about 30 years ago. He has a 7.6 pack-year smoking history. He has been exposed to tobacco smoke. He has never used smokeless tobacco. He reports that he does not drink alcohol and does not use drugs.    Home Medications:  HYDROcodone-acetaminophen, albuterol sulfate HFA, gabapentin, levocetirizine, and sertraline    Allergies:  Allergies   Allergen Reactions    Codeine Hives and Nausea And Vomiting    Lexapro [Escitalopram] Rash       Objective    Objective     Vitals:   Temp:  [97.6 °F (36.4 °C)] 97.6 °F (36.4 °C)  Heart Rate:  [54] 54  Resp:  [11] 11  BP: (121)/(78) 121/78    Physical Exam  Constitutional:       Appearance: He is normal weight.   Cardiovascular:      Comments: No edema  Pulmonary:      Effort: Pulmonary effort is normal.   Skin:     General: Skin is warm and dry.   Neurological:      Mental Status: He is alert.    Psychiatric:         Mood and Affect: Mood normal.         Assessment & Plan   Assessment / Plan     Brief Patient Summary:  Carlos Alberto Juan is a 55 y.o. male who has a right L5-S1 disc herniation with radiculopathy.    Active Hospital Problems:  Active Hospital Problems    Diagnosis     **Herniated nucleus pulposus, L5-S1, right      Plan:   OR today for minimally invasive lumbar discectomy, right approach, lumbar 5 to sacral 1.  Risks and benefits discussed with patient.  Desires to proceed.    VTE Prophylaxis:  Mechanical VTE prophylaxis orders are present.        CODE STATUS:       Admission Status:  I believe this patient meets outpatient status.    Seth Preston MD

## 2025-07-11 NOTE — DISCHARGE INSTRUCTIONS
DISCHARGE INSTRUCTIONS  DISCECTOMY/ LAMINECTOMY  [x] MINIMALLY INVASIVE      For your surgery you had:  General anesthesia (you may have a sore throat for the first 24 hours)    You may experience dizziness, drowsiness, or light-headedness for several hours following surgery  Do not stay alone today or tonight.  Limit your activity for 24 hours.  You should not drive, operate machinery, drink alcohol, or sign legally binding documents for 24 hours or while you are taking pain medication.    Activity  For the first two weeks following surgery, remain close to home and do not do any lifting, bending or strenuous activity.  Walking is the best exercise and you can increase the amount you walk as you feel like it.  Riding in a car for short distances (15-20 minutes) is okay but do not drive until you are off all narcotic medications.  If you have a sedentary job, you may resume work as soon as you feel like it (this is rarely less than one week).    Pain Control  Take your pain medicines as needed and as prescribed.  As you are feeling better, you can decrease the prescribed pain medication and take over-the-counter medications such as Tylenol or Ibuprofen.  The prescribed pain medicines tend to be constipating so it is important to eat a well-balanced diet and take a stool softener if recommended by the doctor.  Activity such as walking also helps keep your bowels regular.    Last dose of pain medication was given at:      Incision Care  Your sutures are under the skin and will dissolve in time.  You may shower tomorrow, no submerging of incision for 2 weeks.       [x] You have a clear dressing, which you should remove in 3-4 days.  Beneath this dressing are steri-strips that will peel off over time.  If these steri-strips have not peeled off in 10-14 days, you may remove them.    Gently washing your incision with mild soap and rinsing with water during your shower is all you need to do to care for the incision.   Do not scrub the incision or sit in the bathtub.  Check your incision site each day to see how it looks.  Some redness and bruising is normal for the first few days.    NOTIFY YOUR DOCTOR IF YOU EXPERIENCE ANY OF THE FOLLOWING:   You have a fever over 100.8o Fahrenheit orally  Shaking chills  Your incision is red, hot to touch, or has excessive drainage  Your incision starts to separate  Increase in bleeding or bleeding that is excessive  Nausea, vomiting and/or pain not controlled by prescribed medications  Unable to urinate in 6 hours after surgery  You may contact 's clinic at [phone] with any questions or concerns.  If unable to reach your doctor, please go to the closest emergency room.    SPECIAL INSTRUCTIONS:  1) No driving for 5-7 days and not taking narcotics.   2) May shower tomorrow no submerging incision.   3) Do not lift / push / pull more than 8-10 lbs.   4) Minimize bending/twisting at the waist and jarring activity such as lawnmower.

## 2025-07-11 NOTE — OP NOTE
LUMBAR DISCECTOMY POSTERIOR WITH METRIX  Procedure Report    Patient Name:  Carlos Alberto Juan  YOB: 1970    Date of Surgery:  7/11/2025     Indications: Right leg pain and numbness with L5-S1 disc herniation    Pre-op Diagnosis:   Herniated nucleus pulposus, L5-S1, right [M51.27]       Post-Op Diagnosis Codes:     * Herniated nucleus pulposus, L5-S1, right [M51.27]    Procedure/CPT® Codes:  AZ LAMNOTMY INCL W/DCMPRSN NRV ROOT 1 INTRSPC LUMBR [57867]    Procedure(s):  MINIMALLY INVASIVE LUMBAR DISCECTOMY, right, lumbar five -sacral one    Staff:  Surgeon(s):  Seth Preston MD    Assistant: Jada Fitch RN CSA    Anesthesia: General    Estimated Blood Loss: 30 mL      Specimen:          None        Findings: Superior disc herniation    Complications: None    Description of Procedure: After informed consent was obtained, the patient was brought to the operating room.  After induction of adequate general endotracheal anesthesia the patient was placed in the prone position on the Mohan table utilizing the Paul frame.  All pressure points were padded.  The back was prepped and draped in the typical fashion.  The midline was marked and a line approximately 1-1/2 cm to the right of midline was drawn.  On this line the L5-S1 interspace was localized using a spinal needle and the C arm.  A 2 cm incision was then made over the level.  The Boss tubular retractor system was used to dilate the tissue docking at L5-S1.  The level was again confirmed with fluoroscopy.  The microscope was brought in and used for the remainder of the case for improved magnification and illumination.  The lamina was cleared of soft tissue and the Kerrison punches were used to remove the lamina above the insertion of the ligamentum flavum.  The ligamentum flavum was then resected inferiorly and laterally undercutting the medial facet.  The nerve root was mobilized medially and retracted by my assistant. There was a large  superior disc extrusion. The lateral edge of the disc herniation was entered using the tips of the bipolar. The upbiting pituitary was used to remove the disc herniation. After decompression was felt to be adequate, hemostasis was obtained using the bipolar electrocautery as well as Gelfoam with thrombin.  The wound was irrigated with normal saline.40 mg of depo medrol was placed over the S1 nerve root.     The tubular retractor was removed and hemostasis assured in the soft tissue.  The incision was reapproximated using interrupted 0 Vicryl in the fascia and a 2-0 Vicryl in the subcutaneous tissue.  The wound was dressed with Mastisol, Steri-Strips, Telfa and a Tegaderm.     Assistant: Jada Fitch RN CSA  was responsible for performing the following activities: Retraction, Suction, Irrigation, Closing, and Placing Dressing and their skilled assistance was necessary for the success of this case.    Seth Preston MD     Date: 7/11/2025  Time: 09:48 EDT

## 2025-07-11 NOTE — ANESTHESIA PREPROCEDURE EVALUATION
Anesthesia Evaluation     Patient summary reviewed and Nursing notes reviewed   no history of anesthetic complications:   NPO Solid Status: > 8 hours  NPO Liquid Status: > 2 hours           Airway   Mallampati: II  TM distance: >3 FB  Neck ROM: full  Possible difficult intubation  Dental          Pulmonary - normal exam    breath sounds clear to auscultation  (+) a smoker Current, Abstained day of surgery, cigarettes,  Cardiovascular - normal exam  Exercise tolerance: good (4-7 METS)    ECG reviewed  Rhythm: regular  Rate: normal    (+) dysrhythmias (HR commonly in 40s) Bradycardia    ROS comment: EK bpm  - NORMAL ECG -  Sinus rhythm  ST elev, probable normal early repol pattern  When compared with ECG of 16-Dec-2018 08:58:17,  No significant change  Electronically Signed By: Ned Singer ([facility]) 2025 09:57:26  Date and Time of Study:2025 20:10:52      Neuro/Psych  (+) numbness, psychiatric history Anxiety and Depression  GI/Hepatic/Renal/Endo - negative ROS     Musculoskeletal     (+) back pain  Abdominal  - normal exam   Substance History - negative use     OB/GYN negative ob/gyn ROS         Other - negative ROS       ROS/Med Hx Other: PAT Nursing Notes unavailable.               Anesthesia Plan    ASA 2     general     (Patient understands anesthesia not responsible for dental damage.)  intravenous induction     Anesthetic plan, risks, benefits, and alternatives have been provided, discussed and informed consent has been obtained with: patient.    Use of blood products discussed with patient .    Plan discussed with CRNA.    CODE STATUS:

## 2025-07-11 NOTE — ANESTHESIA POSTPROCEDURE EVALUATION
Patient: Carlos Alberto Juan    Procedure Summary       Date: 07/11/25 Room / Location: [facility] OR 06 / [facility] MAIN OR    Anesthesia Start: 0834 Anesthesia Stop: 0953    Procedure: MINIMALLY INVASIVE LUMBAR DISCECTOMY, right, lumbar five -sacral one (Right: Spine Lumbar) Diagnosis:       Herniated nucleus pulposus, L5-S1, right      (Herniated nucleus pulposus, L5-S1, right [M51.27])    Surgeons: Seth Preston MD Provider: Alysa Rose MD    Anesthesia Type: general ASA Status: 2            Anesthesia Type: general    Vitals  Vitals Value Taken Time   /82 07/11/25 10:02   Temp 36.4 °C (97.6 °F) 07/11/25 09:50   Pulse 52 07/11/25 10:05   Resp 14 07/11/25 10:00   SpO2 100 % 07/11/25 10:05   Vitals shown include unfiled device data.        Post Anesthesia Care and Evaluation    Patient location during evaluation: bedside  Patient participation: complete - patient participated  Level of consciousness: awake  Pain management: adequate    Airway patency: patent  PONV Status: none  Cardiovascular status: acceptable and stable  Respiratory status: acceptable  Hydration status: acceptable

## 2025-07-31 ENCOUNTER — OFFICE VISIT (OUTPATIENT)
Dept: NEUROSURGERY | Facility: CLINIC | Age: 55
End: 2025-07-31
Payer: COMMERCIAL

## 2025-07-31 VITALS
WEIGHT: 135 LBS | SYSTOLIC BLOOD PRESSURE: 131 MMHG | HEIGHT: 72 IN | DIASTOLIC BLOOD PRESSURE: 86 MMHG | BODY MASS INDEX: 18.28 KG/M2 | HEART RATE: 66 BPM

## 2025-07-31 DIAGNOSIS — Z98.890 STATUS POST LUMBAR SURGERY: ICD-10-CM

## 2025-07-31 DIAGNOSIS — M51.27 HERNIATED NUCLEUS PULPOSUS, L5-S1, RIGHT: Primary | ICD-10-CM

## 2025-07-31 PROCEDURE — 99024 POSTOP FOLLOW-UP VISIT: CPT | Performed by: PHYSICIAN ASSISTANT

## 2025-07-31 NOTE — PROGRESS NOTES
Patient being seen for today for Post-op (MINIMALLY INVASIVE LUMBAR DISCECTOMY completed on  7/11/2025/)  .    Subjective    Carlos Alberto Juan is a 55 y.o. male that presents with Post-op (MINIMALLY INVASIVE LUMBAR DISCECTOMY completed on  7/11/2025/)  .    HPI  Previously: He is status post minimally invasive lumbar discectomy using a right approach at L5-S1 on 7/11/2025.  He previously complained of right leg pain with a large right disc extrusion at L5-S1.    Today: He reports improved leg pain, but ongoing numbness in the right leg, which he feels may also be improving today.    He denies new or changed complaints otherwise.     reports that he has been smoking cigarettes. He started smoking about 30 years ago. He has a 7.6 pack-year smoking history. He has been exposed to tobacco smoke. He has never used smokeless tobacco.    Review of Systems   Musculoskeletal:  Positive for back pain.   Neurological:  Positive for numbness.       Objective   Vitals:    07/31/25 1047   BP: 131/86   Pulse: 66        Physical Exam  Constitutional:       Appearance: Normal appearance.   Pulmonary:      Effort: Pulmonary effort is normal.   Musculoskeletal:         General: Tenderness (right lumbar) present.      Comments: SLR negative   Skin:     Comments: Right lumbar incision is well-healed without erythema   Neurological:      General: No focal deficit present.      Mental Status: He is alert and oriented to person, place, and time.      Sensory: No sensory deficit.      Motor: No weakness.      Deep Tendon Reflexes: Reflexes normal.   Psychiatric:         Mood and Affect: Mood normal.         Behavior: Behavior normal.          Result Review   None.     Assessment and Plan {CC Problem List  Visit Diagnosis  ROS  Review (Popup)  Health Maintenance  Quality  BestPractice  Medications  SmartSets  SnapShot Encounters  Media :23}   Diagnoses and all orders for this visit:    1. Herniated nucleus pulposus, L5-S1,  right (Primary)    2. Status post lumbar surgery    He has seen improvement in the leg pain, but has ongoing numbness.    He will continue physical restrictions for the next 3 weeks, then resume normal activity as tolerated.    I am recommending the following restrictions: No heavy lifting greater than 10 lb, limit twisting and bending at the waist, avoidance of strenuous activity.    He will monitor for new or worsening complaints and notify us of change.    Follow Up {Instructions Charge Capture  Follow-up Communications :23}   Return if symptoms worsen or fail to improve.

## 2025-08-20 ENCOUNTER — TELEPHONE (OUTPATIENT)
Dept: NEUROSURGERY | Facility: CLINIC | Age: 55
End: 2025-08-20
Payer: COMMERCIAL

## (undated) DEVICE — APPL CHLORAPREP HI/LITE 26ML ORNG

## (undated) DEVICE — ANTIBACTERIAL UNDYED BRAIDED (POLYGLACTIN 910), SYNTHETIC ABSORBABLE SUTURE: Brand: COATED VICRYL

## (undated) DEVICE — STERILE POLYISOPRENE POWDER-FREE SURGICAL GLOVES WITH EMOLLIENT COATING: Brand: PROTEXIS

## (undated) DEVICE — LAMINECTOMY CERVICAL DISC-LF: Brand: MEDLINE INDUSTRIES, INC.

## (undated) DEVICE — SLV SCD KN/LEN ADJ EXPRSS BLENDED MD 1P/U

## (undated) DEVICE — DRP MICROSCP LECIA W/CLEARLENS 137X381CM

## (undated) DEVICE — THE STERILE LIGHT HANDLE COVER IS USED WITH STERIS SURGICAL LIGHTING AND VISUALIZATION SYSTEMS.

## (undated) DEVICE — SUT VIC 0/0 UR6 27IN DYED J603H

## (undated) DEVICE — GAMMEX® NON-LATEX SIZE 7.5, STERILE NEOPRENE POWDER-FREE SURGICAL GLOVE: Brand: GAMMEX

## (undated) DEVICE — GLV SURG BIOGEL LTX PF 7 1/2

## (undated) DEVICE — SYR LL TP 10ML STRL

## (undated) DEVICE — Device